# Patient Record
Sex: MALE | Race: WHITE
[De-identification: names, ages, dates, MRNs, and addresses within clinical notes are randomized per-mention and may not be internally consistent; named-entity substitution may affect disease eponyms.]

---

## 2021-03-02 ENCOUNTER — HOSPITAL ENCOUNTER (EMERGENCY)
Dept: HOSPITAL 8 - ED | Age: 66
LOS: 1 days | Discharge: HOME | End: 2021-03-03
Payer: SELF-PAY

## 2021-03-02 VITALS — BODY MASS INDEX: 23.56 KG/M2 | HEIGHT: 72 IN | WEIGHT: 173.94 LBS

## 2021-03-02 DIAGNOSIS — I10: ICD-10-CM

## 2021-03-02 DIAGNOSIS — R91.1: Primary | ICD-10-CM

## 2021-03-02 DIAGNOSIS — R60.0: ICD-10-CM

## 2021-03-02 DIAGNOSIS — R06.00: ICD-10-CM

## 2021-03-02 DIAGNOSIS — I25.2: ICD-10-CM

## 2021-03-02 LAB
ALBUMIN SERPL-MCNC: 3.5 G/DL (ref 3.4–5)
ALP SERPL-CCNC: 92 U/L (ref 45–117)
ALT SERPL-CCNC: 50 U/L (ref 12–78)
ANION GAP SERPL CALC-SCNC: 7 MMOL/L (ref 5–15)
BASOPHILS # BLD AUTO: 0 X10^3/UL (ref 0–0.1)
BASOPHILS NFR BLD AUTO: 1 % (ref 0–1)
BILIRUB SERPL-MCNC: 2.5 MG/DL (ref 0.2–1)
CALCIUM SERPL-MCNC: 8.4 MG/DL (ref 8.5–10.1)
CHLORIDE SERPL-SCNC: 110 MMOL/L (ref 98–107)
CREAT SERPL-MCNC: 1.43 MG/DL (ref 0.7–1.3)
EOSINOPHIL # BLD AUTO: 0 X10^3/UL (ref 0–0.4)
EOSINOPHIL NFR BLD AUTO: 1 % (ref 1–7)
ERYTHROCYTE [DISTWIDTH] IN BLOOD BY AUTOMATED COUNT: 14.3 % (ref 9.4–14.8)
LYMPHOCYTES # BLD AUTO: 1.4 X10^3/UL (ref 1–3.4)
LYMPHOCYTES NFR BLD AUTO: 20 % (ref 22–44)
MCH RBC QN AUTO: 31.7 PG (ref 27.5–34.5)
MCHC RBC AUTO-ENTMCNC: 33.5 G/DL (ref 33.2–36.2)
MD: NO
MONOCYTES # BLD AUTO: 0.5 X10^3/UL (ref 0.2–0.8)
MONOCYTES NFR BLD AUTO: 7 % (ref 2–9)
NEUTROPHILS # BLD AUTO: 4.9 X10^3/UL (ref 1.8–6.8)
NEUTROPHILS NFR BLD AUTO: 71 % (ref 42–75)
PLATELET # BLD AUTO: 202 X10^3/UL (ref 130–400)
PMV BLD AUTO: 9.4 FL (ref 7.4–10.4)
PROT SERPL-MCNC: 6.6 G/DL (ref 6.4–8.2)
RBC # BLD AUTO: 4.58 X10^6/UL (ref 4.38–5.82)
TROPONIN I SERPL-MCNC: 0.01 NG/ML (ref 0–0.04)

## 2021-03-02 PROCEDURE — 71045 X-RAY EXAM CHEST 1 VIEW: CPT

## 2021-03-02 PROCEDURE — 71260 CT THORAX DX C+: CPT

## 2021-03-02 PROCEDURE — 93005 ELECTROCARDIOGRAM TRACING: CPT

## 2021-03-02 PROCEDURE — 85025 COMPLETE CBC W/AUTO DIFF WBC: CPT

## 2021-03-02 PROCEDURE — 84484 ASSAY OF TROPONIN QUANT: CPT

## 2021-03-02 PROCEDURE — 96361 HYDRATE IV INFUSION ADD-ON: CPT

## 2021-03-02 PROCEDURE — 36415 COLL VENOUS BLD VENIPUNCTURE: CPT

## 2021-03-02 PROCEDURE — 80053 COMPREHEN METABOLIC PANEL: CPT

## 2021-03-02 PROCEDURE — 96360 HYDRATION IV INFUSION INIT: CPT

## 2021-03-02 PROCEDURE — 83880 ASSAY OF NATRIURETIC PEPTIDE: CPT

## 2021-03-02 PROCEDURE — 99285 EMERGENCY DEPT VISIT HI MDM: CPT

## 2021-03-02 NOTE — NUR
PT SITTING UPRIGHT ON HSONDA METCALF, VSS. PT DENIES ANY NEEDS AT THIS TIME. CALL 
LIGHT AND PERSONAL BELONGINGS WITHIN REACH.

## 2021-03-02 NOTE — NUR
PT C/O "FEELING REALLY ANXIOUS AND I CAN'T SIT STILL, IM CRAWLING OUT OF MY 
SKIN". ERP AWARE. WILL MEDICATE PER EMAR

## 2021-03-02 NOTE — NUR
PT SITTING UPRIGHT ON GURKRZYSZTOF, NADN, VSS. PIV INSERTION PER ERP ORDER, PT 
TOLERATED WELL. PT DENIES ANY NEEDS AT THIS TIME. CALL LIGHT AND PERSONAL 
BELONGINGS WITHIN REACH.

## 2021-03-02 NOTE — NUR
PT SITTING UPRIGHT ON GURNEY. ROOM AIR O2 SAT NOTECD TO BE 86% ON ROOM AIR. PT 
PLACED ON 2L O2 VIA NASAL CANNULA. PT TOLERATING WELL. NO ADDITIONAL NEEDS AT 
THIS TIME. CALL LIGHT AND PERSONAL BELONGINGS WITHIN REACH

## 2021-03-02 NOTE — NUR
PT SITTING UPRIGHT ON EDGE OF GURNEY, ANXIOUS. "I DONT FEEL ANY LESS ANXIOUS 
AFTER THAT MEDICATION" ERP AWARE. PT DENIES ANY NEEDS AT THIS TIME. CALL LIGHT 
AND BELONGINGS WITHIN REACH

## 2021-03-03 VITALS — SYSTOLIC BLOOD PRESSURE: 156 MMHG | DIASTOLIC BLOOD PRESSURE: 72 MMHG

## 2024-04-02 ENCOUNTER — APPOINTMENT (OUTPATIENT)
Dept: RADIOLOGY | Facility: MEDICAL CENTER | Age: 69
DRG: 193 | End: 2024-04-02
Attending: EMERGENCY MEDICINE
Payer: MEDICARE

## 2024-04-02 ENCOUNTER — HOSPITAL ENCOUNTER (INPATIENT)
Facility: MEDICAL CENTER | Age: 69
LOS: 7 days | DRG: 193 | End: 2024-04-09
Attending: EMERGENCY MEDICINE | Admitting: STUDENT IN AN ORGANIZED HEALTH CARE EDUCATION/TRAINING PROGRAM
Payer: MEDICARE

## 2024-04-02 ENCOUNTER — APPOINTMENT (OUTPATIENT)
Dept: CARDIOLOGY | Facility: MEDICAL CENTER | Age: 69
DRG: 193 | End: 2024-04-02
Attending: STUDENT IN AN ORGANIZED HEALTH CARE EDUCATION/TRAINING PROGRAM
Payer: MEDICARE

## 2024-04-02 DIAGNOSIS — I51.3 LV (LEFT VENTRICULAR) MURAL THROMBUS: ICD-10-CM

## 2024-04-02 DIAGNOSIS — I50.9 ACUTE ON CHRONIC CONGESTIVE HEART FAILURE, UNSPECIFIED HEART FAILURE TYPE (HCC): ICD-10-CM

## 2024-04-02 DIAGNOSIS — I63.449 CEREBROVASCULAR ACCIDENT (CVA) DUE TO EMBOLISM OF CEREBELLAR ARTERY, UNSPECIFIED BLOOD VESSEL LATERALITY (HCC): ICD-10-CM

## 2024-04-02 DIAGNOSIS — I50.23 ACUTE ON CHRONIC SYSTOLIC HEART FAILURE (HCC): ICD-10-CM

## 2024-04-02 DIAGNOSIS — J18.9 COMMUNITY ACQUIRED PNEUMONIA OF RIGHT UPPER LOBE OF LUNG: ICD-10-CM

## 2024-04-02 PROBLEM — I25.10 CORONARY ARTERY DISEASE INVOLVING NATIVE CORONARY ARTERY OF NATIVE HEART WITHOUT ANGINA PECTORIS: Status: ACTIVE | Noted: 2024-04-02

## 2024-04-02 PROBLEM — Z86.711 HISTORY OF PULMONARY EMBOLUS (PE): Status: ACTIVE | Noted: 2024-04-02

## 2024-04-02 PROBLEM — J96.01 ACUTE RESPIRATORY FAILURE WITH HYPOXIA (HCC): Status: ACTIVE | Noted: 2024-04-02

## 2024-04-02 PROBLEM — I10 ESSENTIAL HYPERTENSION: Status: ACTIVE | Noted: 2024-04-02

## 2024-04-02 PROBLEM — E78.5 DYSLIPIDEMIA: Status: ACTIVE | Noted: 2024-04-02

## 2024-04-02 LAB
ANION GAP SERPL CALC-SCNC: 15 MMOL/L (ref 7–16)
APTT PPP: 30 SEC (ref 24.7–36)
BASOPHILS # BLD AUTO: 0.2 % (ref 0–1.8)
BASOPHILS # BLD: 0.02 K/UL (ref 0–0.12)
BUN SERPL-MCNC: 34 MG/DL (ref 8–22)
CALCIUM SERPL-MCNC: 8.7 MG/DL (ref 8.5–10.5)
CHLORIDE SERPL-SCNC: 105 MMOL/L (ref 96–112)
CHOLEST SERPL-MCNC: 138 MG/DL (ref 100–199)
CO2 SERPL-SCNC: 22 MMOL/L (ref 20–33)
CREAT SERPL-MCNC: 1.24 MG/DL (ref 0.5–1.4)
EKG IMPRESSION: NORMAL
EOSINOPHIL # BLD AUTO: 0.01 K/UL (ref 0–0.51)
EOSINOPHIL NFR BLD: 0.1 % (ref 0–6.9)
ERYTHROCYTE [DISTWIDTH] IN BLOOD BY AUTOMATED COUNT: 42.8 FL (ref 35.9–50)
FLUAV RNA SPEC QL NAA+PROBE: NEGATIVE
FLUBV RNA SPEC QL NAA+PROBE: NEGATIVE
GFR SERPLBLD CREATININE-BSD FMLA CKD-EPI: 63 ML/MIN/1.73 M 2
GLUCOSE SERPL-MCNC: 89 MG/DL (ref 65–99)
HCT VFR BLD AUTO: 44.4 % (ref 42–52)
HDLC SERPL-MCNC: 32 MG/DL
HGB BLD-MCNC: 15.5 G/DL (ref 14–18)
IMM GRANULOCYTES # BLD AUTO: 0.02 K/UL (ref 0–0.11)
IMM GRANULOCYTES NFR BLD AUTO: 0.2 % (ref 0–0.9)
INR PPP: 1.51 (ref 0.87–1.13)
LDLC SERPL CALC-MCNC: 88 MG/DL
LV EJECT FRACT  99904: 15
LYMPHOCYTES # BLD AUTO: 1.75 K/UL (ref 1–4.8)
LYMPHOCYTES NFR BLD: 18.3 % (ref 22–41)
MCH RBC QN AUTO: 31.6 PG (ref 27–33)
MCHC RBC AUTO-ENTMCNC: 34.9 G/DL (ref 32.3–36.5)
MCV RBC AUTO: 90.4 FL (ref 81.4–97.8)
MONOCYTES # BLD AUTO: 0.69 K/UL (ref 0–0.85)
MONOCYTES NFR BLD AUTO: 7.2 % (ref 0–13.4)
NEUTROPHILS # BLD AUTO: 7.05 K/UL (ref 1.82–7.42)
NEUTROPHILS NFR BLD: 74 % (ref 44–72)
NRBC # BLD AUTO: 0 K/UL
NRBC BLD-RTO: 0 /100 WBC (ref 0–0.2)
NT-PROBNP SERPL IA-MCNC: ABNORMAL PG/ML (ref 0–125)
PLATELET # BLD AUTO: 246 K/UL (ref 164–446)
PMV BLD AUTO: 10.9 FL (ref 9–12.9)
POTASSIUM SERPL-SCNC: 3.6 MMOL/L (ref 3.6–5.5)
PROCALCITONIN SERPL-MCNC: 0.15 NG/ML
PROTHROMBIN TIME: 18.4 SEC (ref 12–14.6)
RBC # BLD AUTO: 4.91 M/UL (ref 4.7–6.1)
RSV RNA SPEC QL NAA+PROBE: NEGATIVE
SARS-COV-2 RNA RESP QL NAA+PROBE: NOTDETECTED
SODIUM SERPL-SCNC: 142 MMOL/L (ref 135–145)
TRIGL SERPL-MCNC: 90 MG/DL (ref 0–149)
TROPONIN T SERPL-MCNC: 28 NG/L (ref 6–19)
TROPONIN T SERPL-MCNC: 29 NG/L (ref 6–19)
UFH PPP CHRO-ACNC: 0.77 IU/ML
UFH PPP CHRO-ACNC: <0.1 IU/ML
WBC # BLD AUTO: 9.5 K/UL (ref 4.8–10.8)

## 2024-04-02 PROCEDURE — 85730 THROMBOPLASTIN TIME PARTIAL: CPT

## 2024-04-02 PROCEDURE — 700102 HCHG RX REV CODE 250 W/ 637 OVERRIDE(OP): Performed by: INTERNAL MEDICINE

## 2024-04-02 PROCEDURE — 85520 HEPARIN ASSAY: CPT | Mod: 91

## 2024-04-02 PROCEDURE — 99497 ADVNCD CARE PLAN 30 MIN: CPT | Performed by: INTERNAL MEDICINE

## 2024-04-02 PROCEDURE — 84145 PROCALCITONIN (PCT): CPT

## 2024-04-02 PROCEDURE — 71045 X-RAY EXAM CHEST 1 VIEW: CPT

## 2024-04-02 PROCEDURE — 700102 HCHG RX REV CODE 250 W/ 637 OVERRIDE(OP)

## 2024-04-02 PROCEDURE — 85025 COMPLETE CBC W/AUTO DIFF WBC: CPT

## 2024-04-02 PROCEDURE — 0241U HCHG SARS-COV-2 COVID-19 NFCT DS RESP RNA 4 TRGT ED POC: CPT

## 2024-04-02 PROCEDURE — 80061 LIPID PANEL: CPT

## 2024-04-02 PROCEDURE — 700111 HCHG RX REV CODE 636 W/ 250 OVERRIDE (IP)

## 2024-04-02 PROCEDURE — 700111 HCHG RX REV CODE 636 W/ 250 OVERRIDE (IP): Mod: JZ | Performed by: STUDENT IN AN ORGANIZED HEALTH CARE EDUCATION/TRAINING PROGRAM

## 2024-04-02 PROCEDURE — 36415 COLL VENOUS BLD VENIPUNCTURE: CPT

## 2024-04-02 PROCEDURE — 99223 1ST HOSP IP/OBS HIGH 75: CPT | Mod: AI,25 | Performed by: STUDENT IN AN ORGANIZED HEALTH CARE EDUCATION/TRAINING PROGRAM

## 2024-04-02 PROCEDURE — 93306 TTE W/DOPPLER COMPLETE: CPT | Mod: 26 | Performed by: INTERNAL MEDICINE

## 2024-04-02 PROCEDURE — 93005 ELECTROCARDIOGRAM TRACING: CPT | Performed by: EMERGENCY MEDICINE

## 2024-04-02 PROCEDURE — 700102 HCHG RX REV CODE 250 W/ 637 OVERRIDE(OP): Performed by: STUDENT IN AN ORGANIZED HEALTH CARE EDUCATION/TRAINING PROGRAM

## 2024-04-02 PROCEDURE — 83880 ASSAY OF NATRIURETIC PEPTIDE: CPT

## 2024-04-02 PROCEDURE — A9270 NON-COVERED ITEM OR SERVICE: HCPCS

## 2024-04-02 PROCEDURE — 700105 HCHG RX REV CODE 258: Performed by: STUDENT IN AN ORGANIZED HEALTH CARE EDUCATION/TRAINING PROGRAM

## 2024-04-02 PROCEDURE — 85610 PROTHROMBIN TIME: CPT

## 2024-04-02 PROCEDURE — 80048 BASIC METABOLIC PNL TOTAL CA: CPT

## 2024-04-02 PROCEDURE — 99222 1ST HOSP IP/OBS MODERATE 55: CPT | Performed by: INTERNAL MEDICINE

## 2024-04-02 PROCEDURE — 96374 THER/PROPH/DIAG INJ IV PUSH: CPT

## 2024-04-02 PROCEDURE — A9270 NON-COVERED ITEM OR SERVICE: HCPCS | Performed by: INTERNAL MEDICINE

## 2024-04-02 PROCEDURE — 99285 EMERGENCY DEPT VISIT HI MDM: CPT

## 2024-04-02 PROCEDURE — 770020 HCHG ROOM/CARE - TELE (206)

## 2024-04-02 PROCEDURE — 700111 HCHG RX REV CODE 636 W/ 250 OVERRIDE (IP): Performed by: INTERNAL MEDICINE

## 2024-04-02 PROCEDURE — 84484 ASSAY OF TROPONIN QUANT: CPT | Mod: 91

## 2024-04-02 PROCEDURE — 93306 TTE W/DOPPLER COMPLETE: CPT

## 2024-04-02 PROCEDURE — A9270 NON-COVERED ITEM OR SERVICE: HCPCS | Performed by: STUDENT IN AN ORGANIZED HEALTH CARE EDUCATION/TRAINING PROGRAM

## 2024-04-02 RX ORDER — HEPARIN SODIUM 5000 [USP'U]/100ML
0-30 INJECTION, SOLUTION INTRAVENOUS CONTINUOUS
Status: DISCONTINUED | OUTPATIENT
Start: 2024-04-02 | End: 2024-04-03

## 2024-04-02 RX ORDER — HEPARIN SODIUM 1000 [USP'U]/ML
40 INJECTION, SOLUTION INTRAVENOUS; SUBCUTANEOUS PRN
Status: DISCONTINUED | OUTPATIENT
Start: 2024-04-02 | End: 2024-04-03

## 2024-04-02 RX ORDER — LORAZEPAM 2 MG/ML
0.5 INJECTION INTRAMUSCULAR EVERY 4 HOURS PRN
Status: DISCONTINUED | OUTPATIENT
Start: 2024-04-02 | End: 2024-04-09 | Stop reason: HOSPADM

## 2024-04-02 RX ORDER — ENOXAPARIN SODIUM 100 MG/ML
40 INJECTION SUBCUTANEOUS DAILY
Status: DISCONTINUED | OUTPATIENT
Start: 2024-04-02 | End: 2024-04-02

## 2024-04-02 RX ORDER — LOSARTAN POTASSIUM 25 MG/1
25 TABLET ORAL
Status: DISCONTINUED | OUTPATIENT
Start: 2024-04-02 | End: 2024-04-02

## 2024-04-02 RX ORDER — FUROSEMIDE 10 MG/ML
20 INJECTION INTRAMUSCULAR; INTRAVENOUS
Status: DISCONTINUED | OUTPATIENT
Start: 2024-04-02 | End: 2024-04-03

## 2024-04-02 RX ORDER — METOPROLOL SUCCINATE 25 MG/1
25 TABLET, EXTENDED RELEASE ORAL
Status: DISCONTINUED | OUTPATIENT
Start: 2024-04-02 | End: 2024-04-02

## 2024-04-02 RX ORDER — AZITHROMYCIN 250 MG/1
500 TABLET, FILM COATED ORAL DAILY
Qty: 4 TABLET | Refills: 0 | Status: COMPLETED | OUTPATIENT
Start: 2024-04-03 | End: 2024-04-04

## 2024-04-02 RX ORDER — SCOLOPAMINE TRANSDERMAL SYSTEM 1 MG/1
1 PATCH, EXTENDED RELEASE TRANSDERMAL
Status: DISCONTINUED | OUTPATIENT
Start: 2024-04-02 | End: 2024-04-09 | Stop reason: HOSPADM

## 2024-04-02 RX ORDER — ASPIRIN 81 MG/1
81 TABLET ORAL DAILY
Status: DISCONTINUED | OUTPATIENT
Start: 2024-04-02 | End: 2024-04-09 | Stop reason: HOSPADM

## 2024-04-02 RX ORDER — LABETALOL HYDROCHLORIDE 5 MG/ML
10 INJECTION, SOLUTION INTRAVENOUS EVERY 4 HOURS PRN
Status: DISCONTINUED | OUTPATIENT
Start: 2024-04-02 | End: 2024-04-09 | Stop reason: HOSPADM

## 2024-04-02 RX ORDER — ACETAMINOPHEN 325 MG/1
650 TABLET ORAL EVERY 6 HOURS PRN
Status: DISCONTINUED | OUTPATIENT
Start: 2024-04-02 | End: 2024-04-09 | Stop reason: HOSPADM

## 2024-04-02 RX ORDER — ATORVASTATIN CALCIUM 40 MG/1
40 TABLET, FILM COATED ORAL EVERY EVENING
Status: DISCONTINUED | OUTPATIENT
Start: 2024-04-02 | End: 2024-04-09 | Stop reason: HOSPADM

## 2024-04-02 RX ORDER — LOSARTAN POTASSIUM 25 MG/1
25 TABLET ORAL DAILY
Status: DISCONTINUED | OUTPATIENT
Start: 2024-04-02 | End: 2024-04-09 | Stop reason: HOSPADM

## 2024-04-02 RX ORDER — SPIRONOLACTONE 25 MG/1
25 TABLET ORAL
Status: DISCONTINUED | OUTPATIENT
Start: 2024-04-02 | End: 2024-04-09 | Stop reason: HOSPADM

## 2024-04-02 RX ORDER — DAPAGLIFLOZIN 10 MG/1
10 TABLET, FILM COATED ORAL DAILY
Status: DISCONTINUED | OUTPATIENT
Start: 2024-04-02 | End: 2024-04-09 | Stop reason: HOSPADM

## 2024-04-02 RX ORDER — CARVEDILOL 6.25 MG/1
6.25 TABLET ORAL 2 TIMES DAILY WITH MEALS
Status: DISCONTINUED | OUTPATIENT
Start: 2024-04-02 | End: 2024-04-03

## 2024-04-02 RX ORDER — HEPARIN SODIUM 1000 [USP'U]/ML
80 INJECTION, SOLUTION INTRAVENOUS; SUBCUTANEOUS ONCE
Status: COMPLETED | OUTPATIENT
Start: 2024-04-02 | End: 2024-04-02

## 2024-04-02 RX ADMIN — SCOPOLAMINE 1 PATCH: 1.5 PATCH, EXTENDED RELEASE TRANSDERMAL at 20:18

## 2024-04-02 RX ADMIN — ACETAMINOPHEN 650 MG: 325 TABLET, FILM COATED ORAL at 20:17

## 2024-04-02 RX ADMIN — ASPIRIN 81 MG: 81 TABLET, COATED ORAL at 05:47

## 2024-04-02 RX ADMIN — AMPICILLIN AND SULBACTAM 3 G: 1; 2 INJECTION, POWDER, FOR SOLUTION INTRAMUSCULAR; INTRAVENOUS at 17:31

## 2024-04-02 RX ADMIN — METOPROLOL SUCCINATE 25 MG: 25 TABLET, EXTENDED RELEASE ORAL at 05:47

## 2024-04-02 RX ADMIN — SPIRONOLACTONE 25 MG: 25 TABLET ORAL at 15:36

## 2024-04-02 RX ADMIN — CARVEDILOL 6.25 MG: 6.25 TABLET, FILM COATED ORAL at 17:32

## 2024-04-02 RX ADMIN — AMPICILLIN AND SULBACTAM 3 G: 1; 2 INJECTION, POWDER, FOR SOLUTION INTRAMUSCULAR; INTRAVENOUS at 05:49

## 2024-04-02 RX ADMIN — HEPARIN SODIUM 18 UNITS/KG/HR: 5000 INJECTION, SOLUTION INTRAVENOUS at 16:36

## 2024-04-02 RX ADMIN — DAPAGLIFLOZIN 10 MG: 10 TABLET, FILM COATED ORAL at 16:39

## 2024-04-02 RX ADMIN — LORAZEPAM 0.5 MG: 2 INJECTION, SOLUTION INTRAMUSCULAR; INTRAVENOUS at 20:18

## 2024-04-02 RX ADMIN — FUROSEMIDE 20 MG: 10 INJECTION INTRAMUSCULAR; INTRAVENOUS at 15:36

## 2024-04-02 RX ADMIN — LOSARTAN POTASSIUM 25 MG: 25 TABLET, FILM COATED ORAL at 17:32

## 2024-04-02 RX ADMIN — AMPICILLIN AND SULBACTAM 3 G: 1; 2 INJECTION, POWDER, FOR SOLUTION INTRAMUSCULAR; INTRAVENOUS at 12:12

## 2024-04-02 RX ADMIN — HEPARIN SODIUM 5800 UNITS: 1000 INJECTION, SOLUTION INTRAVENOUS; SUBCUTANEOUS at 16:36

## 2024-04-02 RX ADMIN — ATORVASTATIN CALCIUM 40 MG: 40 TABLET, FILM COATED ORAL at 17:32

## 2024-04-02 ASSESSMENT — COGNITIVE AND FUNCTIONAL STATUS - GENERAL
MOBILITY SCORE: 24
MOBILITY SCORE: 24
DAILY ACTIVITIY SCORE: 24
SUGGESTED CMS G CODE MODIFIER MOBILITY: CI
SUGGESTED CMS G CODE MODIFIER MOBILITY: CH
SUGGESTED CMS G CODE MODIFIER MOBILITY: CH
MOBILITY SCORE: 23
CLIMB 3 TO 5 STEPS WITH RAILING: A LITTLE
DAILY ACTIVITIY SCORE: 24
SUGGESTED CMS G CODE MODIFIER DAILY ACTIVITY: CH
SUGGESTED CMS G CODE MODIFIER DAILY ACTIVITY: CH

## 2024-04-02 ASSESSMENT — LIFESTYLE VARIABLES
DOES PATIENT WANT TO STOP DRINKING: NO
HAVE PEOPLE ANNOYED YOU BY CRITICIZING YOUR DRINKING: NO
EVER HAD A DRINK FIRST THING IN THE MORNING TO STEADY YOUR NERVES TO GET RID OF A HANGOVER: NO
CONSUMPTION TOTAL: NEGATIVE
ALCOHOL_USE: YES
AVERAGE NUMBER OF DAYS PER WEEK YOU HAVE A DRINK CONTAINING ALCOHOL: 0
HAVE YOU EVER FELT YOU SHOULD CUT DOWN ON YOUR DRINKING: NO
TOTAL SCORE: 0
HOW MANY TIMES IN THE PAST YEAR HAVE YOU HAD 5 OR MORE DRINKS IN A DAY: 0
TOTAL SCORE: 0
ON A TYPICAL DAY WHEN YOU DRINK ALCOHOL HOW MANY DRINKS DO YOU HAVE: 0
EVER FELT BAD OR GUILTY ABOUT YOUR DRINKING: NO
TOTAL SCORE: 0

## 2024-04-02 ASSESSMENT — ENCOUNTER SYMPTOMS
HEMOPTYSIS: 0
ABDOMINAL PAIN: 0
VOMITING: 0
SHORTNESS OF BREATH: 1
HEMATOCHEZIA: 0
FEVER: 0
DIAPHORESIS: 0
NAUSEA: 0
COUGH: 1
DIARRHEA: 0
CHILLS: 0
WHEEZING: 0

## 2024-04-02 ASSESSMENT — PATIENT HEALTH QUESTIONNAIRE - PHQ9
1. LITTLE INTEREST OR PLEASURE IN DOING THINGS: NOT AT ALL
SUM OF ALL RESPONSES TO PHQ9 QUESTIONS 1 AND 2: 0
2. FEELING DOWN, DEPRESSED, IRRITABLE, OR HOPELESS: NOT AT ALL

## 2024-04-02 ASSESSMENT — PAIN DESCRIPTION - PAIN TYPE: TYPE: ACUTE PAIN

## 2024-04-02 NOTE — PROGRESS NOTES
4 Eyes Skin Assessment Completed by JULIA Mallory and JULIA Long.    Head WDL  Ears WDL  Nose WDL  Mouth WDL  Neck WDL  Breast/Chest WDL  Shoulder Blades WDL  Spine WDL  (R) Arm/Elbow/Hand Blanching  (L) Arm/Elbow/Hand Blanching  Abdomen WDL  Groin WDL  Scrotum/Coccyx/Buttocks WDL  (R) Leg WDL  (L) Leg WDL  (R) Heel/Foot/Toe WDL  (L) Heel/Foot/Toe WDL          Devices In Places Nasal Cannula      Interventions In Place Gray Ear Foams    Possible Skin Injury No    Pictures Uploaded Into Epic N/A  Wound Consult Placed N/A  RN Wound Prevention Protocol Ordered No

## 2024-04-02 NOTE — ED NOTES
Pharmacy Medication Reconciliation      ~Medication reconciliation updated and complete per patient   ~Allergies have been verified and updated   ~No oral ABX within the last 30 days  ~[facility] home pharmacy :  [facility]      ~Anticoagulants (rivaroxaban, apixaban, edoxaban, dabigatran, warfarin, enoxaparin) taken in the last 14 days? NO

## 2024-04-02 NOTE — CONSULTS
Cardiology Initial Consult Note    Date of note:    4/2/2024      Consulting Physician: Alejandro Patiño M.D.    Name:   Ministerio Tenorio     YOB: 1955  Age:   68 y.o.  male   MRN:   [id number]    Reason for Consultation: Heart failure with reduced ejection fraction    HPI:  Ministerio Tenorio is a 68 y.o. male history of coronary artery disease, status post stents in 2022, heart failure with reduced ejection fraction, status post AICD (Medtronic device it seems), hypertension, hyperlipidemia, history of pulmonary embolism, who was transferred from [facility] for further management of heart failure with reduced ejection fraction.    Patient reports he had stopped all his medications about 7 to 8 months ago.  He just had a hard time getting the medications refilled he states.  He was doing okay he reports until 4 days ago when he started to have a cough productive of greenish sputum.  He then progressively got more weak.    In terms of his cardiac history, he has a history of anterior wall myocardial infarction in 6/2019 with a delayed presentation, decreased LVEF of 30 to 35%, subsequently had a dual-chamber AICD placed 3/22/2022.  The patient reports that he has gotten most of his care in the [place] area, at [facility] where his family is at.  However after 2022, he moved up to [facility].  There are multiple notes that indicates when he presented with a large anterior wall infarction, despite stent placement, there was no reflow.  He did also develop an LV apical thrombus for which she did take Eliquis.    Problem list:  Principal Problem:    Acute respiratory failure with hypoxia (HCC) (POA: Yes)  Active Problems:    Essential hypertension (POA: Yes)    Coronary artery disease involving native coronary artery of native heart without angina pectoris (POA: Yes)    Dyslipidemia (POA: Yes)    Pneumonia of both upper lobes due to infectious organism (POA: Yes)    History of  pulmonary embolus (PE) (POA: Yes)  Resolved Problems:    * No resolved hospital problems. *    Past Medical History:   Diagnosis Date    Congestive heart failure (HCC)     Pneumonia      History reviewed. No pertinent surgical history.    No medications prior to admission.     Current Facility-Administered Medications   Medication Dose Route Frequency Provider Last Rate Last Admin    acetaminophen (Tylenol) tablet 650 mg  650 mg Oral Q6HRS PRN Melita Palomares M.D.        labetalol (Normodyne/Trandate) injection 10 mg  10 mg Intravenous Q4HRS PRN Melita Palomares M.D.        aspirin EC tablet 81 mg  81 mg Oral DAILY Melita Palomares M.D.   81 mg at 04/02/24 0547    atorvastatin (Lipitor) tablet 40 mg  40 mg Oral Q EVENING Melita Palomares M.D.        ampicillin/sulbactam (Unasyn) 3 g in  mL IVPB  3 g Intravenous Q6HRS Melita Palomares M.D.   Stopped at 04/02/24 1242    [START ON 4/3/2024] azithromycin (Zithromax) tablet 500 mg  500 mg Oral DAILY Melita Palomares M.D.        carvedilol (Coreg) tablet 6.25 mg  6.25 mg Oral BID WITH MEALS Alejandro Patiño M.D.        heparin infusion 25,000 units in 500 mL 0.45% NACL  0-30 Units/kg/hr Intravenous Continuous Alejandro Patiño M.D.        heparin injection 5,800 Units  80 Units/kg Intravenous Once Alejandro Patiño M.D.        heparin injection 2,900 Units  40 Units/kg Intravenous PRN Alejandro Patiño M.D.        furosemide (Lasix) injection 20 mg  20 mg Intravenous BID DIURETIC Alejandro Patiño M.D.        spironolactone (Aldactone) tablet 25 mg  25 mg Oral Q DAY Alejandro Patiño M.D.        dapagliflozin propanediol (Farxiga) tablet 10 mg  10 mg Oral DAILY Alejandro Patiño M.D.        losartan (Cozaar) tablet 25 mg  25 mg Oral Q DAY Alejandro Patiño M.D.           No Known Allergies    History reviewed. No pertinent family history.    Social History     Socioeconomic History    Marital status:      Spouse name: Not on file    Number of children: Not on file    Years of education: Not on file     Highest education level: Not on file   Occupational History    Not on file   Tobacco Use    Smoking status: Never    Smokeless tobacco: Never   Vaping Use    Vaping Use: Never used   Substance and Sexual Activity    Alcohol use: Not Currently     Alcohol/week: 0.6 oz     Types: 1 Standard drinks or equivalent per week     Comment: occ    Drug use: Yes     Comment: MJ    Sexual activity: Not on file   Other Topics Concern    Not on file   Social History Narrative    Not on file     Social Determinants of Health     Financial Resource Strain: Not on file   Food Insecurity: Not on file   Transportation Needs: Not on file   Physical Activity: Not on file   Stress: Not on file   Social Connections: Not on file   Intimate Partner Violence: Not on file   Housing Stability: Not on file         Intake/Output Summary (Last 24 hours) at 4/2/2024 1453  Last data filed at 4/2/2024 0903  Gross per 24 hour   Intake 220 ml   Output 250 ml   Net -30 ml         Review of Systems   Constitutional: Negative for diaphoresis and fever.   Respiratory:  Positive for cough. Negative for hemoptysis and wheezing.    Gastrointestinal:  Negative for hematochezia and melena.   Genitourinary:  Negative for hematuria.        Physical Exam  Body mass index is 21.7 kg/m².  BP (!) 138/95   Pulse 94   Temp 36.7 °C (98.1 °F) (Temporal)   Resp 18   Ht 1.829 m (6')   Wt 72.6 kg (160 lb)   SpO2 98%   Vitals:    04/02/24 0532 04/02/24 0636 04/02/24 0739 04/02/24 0802   BP: (!) 162/90 (!) 141/79 (!) 138/95    Pulse: 99 92 88 94   Resp:  (!) 21 18    Temp:  36.3 °C (97.4 °F) 36.7 °C (98.1 °F)    TempSrc:  Temporal Temporal    SpO2: 93% 95% 98% 98%   Weight:       Height:         Oxygen Therapy:  Pulse Oximetry: 98 %, O2 (LPM): 2, O2 Delivery Device: Nasal Cannula    Physical Exam  Constitutional:       Appearance: Normal appearance.      Comments: Poor dentition   Eyes:      Extraocular Movements: Extraocular movements intact.      Conjunctiva/sclera:  Conjunctivae normal.   Neck:      Vascular: No carotid bruit or JVD.   Cardiovascular:      Rate and Rhythm: Normal rate and regular rhythm.      Pulses:           Radial pulses are 1+ on the right side and 1+ on the left side.        Posterior tibial pulses are 1+ on the right side and 1+ on the left side.      Heart sounds: Murmur heard.      Early diastolic murmur is present with a grade of 1/4 at the lower left sternal border.      No friction rub. No gallop.   Pulmonary:      Effort: Pulmonary effort is normal. No respiratory distress.      Breath sounds: Normal breath sounds. No wheezing or rales.   Abdominal:      General: Bowel sounds are normal.      Palpations: Abdomen is soft.   Musculoskeletal:      Cervical back: Neck supple.      Right lower leg: No edema.      Left lower leg: No edema.   Skin:     General: Skin is warm and dry.   Neurological:      Mental Status: He is alert and oriented to person, place, and time. Mental status is at baseline.   Psychiatric:         Mood and Affect: Mood normal.          Labs (personally reviewed and notable for):   Lab Results   Component Value Date/Time    SODIUM 142 04/02/2024 02:55 AM    POTASSIUM 3.6 04/02/2024 02:55 AM    CHLORIDE 105 04/02/2024 02:55 AM    CO2 22 04/02/2024 02:55 AM    GLUCOSE 89 04/02/2024 02:55 AM    BUN 34 (H) 04/02/2024 02:55 AM    CREATININE 1.24 04/02/2024 02:55 AM      Lab Results   Component Value Date/Time    WBC 9.5 04/02/2024 02:55 AM    RBC 4.91 04/02/2024 02:55 AM    HEMOGLOBIN 15.5 04/02/2024 02:55 AM    HEMATOCRIT 44.4 04/02/2024 02:55 AM    MCV 90.4 04/02/2024 02:55 AM    MCH 31.6 04/02/2024 02:55 AM    MCHC 34.9 04/02/2024 02:55 AM    MPV 10.9 04/02/2024 02:55 AM    NEUTSPOLYS 74.00 (H) 04/02/2024 02:55 AM    LYMPHOCYTES 18.30 (L) 04/02/2024 02:55 AM    MONOCYTES 7.20 04/02/2024 02:55 AM    EOSINOPHILS 0.10 04/02/2024 02:55 AM    BASOPHILS 0.20 04/02/2024 02:55 AM      Lab Results   Component Value Date/Time    CHOLSTRLTOT 138  2024 02:51 AM    LDL 88 2024 02:51 AM    HDL 32 (A) 2024 02:51 AM    TRIGLYCERIDE 90 2024 02:51 AM       Lab Results   Component Value Date/Time    TROPONINT 28 (H) 2024 025     Lab Results   Component Value Date/Time    NTPROBNP 47009 (H) 2024 0255     Lab Results   Component Value Date/Time    HBA1C 5.5 2022 1212     Cardiac Imaging and Procedures Review:    EKG dated 2024: My personal interpretation is sinus rhythm, 95 bpm, left anterior fascicular block, poor R wave progression, consider anterior infarct age undetermined,, LVH, no prior ECG for comparison    Echo dated 2024: Dilated left ventricular cavity size with mild concentric hypertrophy, and severely decreased systolic function, visually estimated LVEF is less than 20%.  Left ventricular apical thrombus is seen.  Borderline right ventricular cavity size with low-normal systolic function.  Device wire seen.  Normal aortic root size.  Mild mitral and aortic regurgitation.      Radiology test Review:  EC-ECHOCARDIOGRAM COMPLETE W/O CONT   Final Result      DX-CHEST-PORTABLE (1 VIEW)   Final Result         1.  Scattered hazy bilateral pulmonary infiltrates, greatest in the right upper lobe.   2.  Trace bilateral pleural effusions   3.  Cardiomegaly   4.  Atherosclerosis        Impression and Medical Decision Makin.  Heart failure with severely reduced ejection fraction.  Patient does not appear significantly fluid overloaded, also does not appear to be hypoperfusing at this time.  There may be a component of upper respiratory infection causing some decompensation.  Patient is willing to try to be more compliant at this time.  He states he [place] will [place] back [place] or stay in the [facility] as there is no cardiology care up where he is currently living.  Although he has a history of methamphetamine use, he reports he has not done any methamphetamine for 40 years.  - Continue with carvedilol  6.25 mg p.o. twice daily  - Continue with losartan 25 mg p.o. daily, titrate up as blood pressure will tolerate it  - Continue with spironolactone 25 mg p.o. daily and Farxiga also has been started.  Agree with IV Lasix 20 twice daily, can change over to p.o. 40 p.o. daily fairly soon.  - Will schedule him for follow-up at heart failure clinic if he decides to remain in [place]    2.  He has a Medtronic dual-chamber AICD.  Unlikely has been interrogated recently.  Will have it interrogated since he is currently in the hospital.  Do not suspect any malfunction.  Patient does not report any firings.    3.  LV apical thrombus.  Agree with heparin drip at this time.  Although traditionally warfarin has been used with goal INR of 2-3 for LV apical thrombus, there has been retrospective study and based on  guidelines that direct thrombin inhibitors is noninferior to warfarin and 1 can be considered in the setting.  Dosages is not as clear, however would recommend if you are going to use Eliquis.  - Due to compliance issue, vitamin K antagonist such as warfarin with frequent INR draws seems less feasible for this patient.  - Recommend Eliquis start with 10 mg twice daily for 7 days followed by 5 mg twice daily for total of 3 months treatment.  This usually should not have to be bridged with heparin drip, can try to overlap it a little bit.    4.  Coronary artery disease, troponin here has been unimpressive no acute ECG changes.  Continue with secondary prevention.  For now would hold off enteric-coated aspirin as patient is to be started on Eliquis.  Can consider adding aspirin as an outpatient.    Thank you for allowing me to participate in the care of this patient.  Please contact me with any questions.      Saloni Joe M.D.  Cardiologist, [facility] [place]     This note was generated using voice recognition software which has a small chance of producing errors of grammar and possibly content. I  have made every reasonable attempt to find and correct any obvious errors, but expect that some may not be found prior to finalization of this note.

## 2024-04-02 NOTE — ASSESSMENT & PLAN NOTE
Patient denies any history of any blood clot in his lungs and is on apixaban  He stopped taking all his meds.  CTA at outside facility negative for PE  Outpatient follow-up

## 2024-04-02 NOTE — ASSESSMENT & PLAN NOTE
Requiring fluid on my evaluation, desaturating on oxygen was lowered  Possible related to systolic HF now on lasix also concern for pneumonia cont on abx switching to Augmentin  Wean off O 2 as tolerated

## 2024-04-02 NOTE — ED NOTES
Pt medicated per MAR. Pt provided juice per request. Pt updated on POC and has no additional requests at this time.

## 2024-04-02 NOTE — PROGRESS NOTES
Patient seen and evaluated bedside.  2D echo reveals severe heart failure with reduced ejection fraction and LV thrombus.  I have consulted cardiology.  I have started the patient on IV heparin, monitor Xa.  I have started the patient on Coreg, losartan, Aldactone and Farxiga.  Patient states he last saw his cardiologist 2 years ago and he stopped taking his medications 6 months ago because he felt better and did not think he needed to take them anymore.     I counseled the patient on the importance of continuing his medications as prescribed and to continue close follow-up with cardiology.  Patient agrees and would like to get back on medications to treat his CAD and CHF    I discussed advance care planning for at least 16 minutes with the patient, including diagnosis of CAD and severe CHF with reduced EF with guarded prognosis, plan of care, risks and benefits of any therapies that could be offered, as well as alternatives including palliation.  The patient would like to be a full code, would like to continue full medical treatment and would also be open to undergoing any intervention if required. Time spent is exclusive of evaluation and management or other separately billable procedures.

## 2024-04-02 NOTE — ASSESSMENT & PLAN NOTE
Patient feeling shortness of breath, has had a productive cough for the past 4 days.  Denies fever/chills  Respiratory panel, procalcitonin ordered  He was started on antibiotics at outside facility, will continue for now

## 2024-04-02 NOTE — ED PROVIDER NOTES
ED Provider Note    CHIEF COMPLAINT  Chief Complaint   Patient presents with    Chest Pain     x4days    Shortness of Breath     X4 days, worsens with exertion          EXTERNAL RECORDS REVIEWED  External ED Note patient was seen at [facility] for progressively worsening shortness of breath over the last 4 days found to be hypoxic with fluid overload on arrival lobe pneumonia.  In the setting of a history of CHF.  Patient was treated with 40 of Lasix and 3 g Unasyn prior to arrival.    HPI/ROS  LIMITATION TO HISTORY   Select: : None  OUTSIDE HISTORIAN(S):  Patient provided 2 L in route and was doing well    Ministerio Tenorio is a 68 y.o. male who presents to the emerged permit as a transfer for history of CHF noncompliant with meds over the last year with fluid overload and right upper lobe pneumonia and hypoxia.  Patient states he feels a lot better on the oxygen.  Her last 4 days with progressive worsening shortness of breath and cough.  No nausea no vomiting.  He states that he did not take meds because he moved too far away to have any follow-up and so did not have any medications for his heart failure.  He is not really complaining of any chest pain or significant leg swelling just feels short of breath with any type of exertion.    PAST MEDICAL HISTORY       SURGICAL HISTORY  patient denies any surgical history    FAMILY HISTORY  No family history on file.    SOCIAL HISTORY  Social History     Tobacco Use    Smoking status: Not on file    Smokeless tobacco: Not on file   Substance and Sexual Activity    Alcohol use: Not on file    Drug use: Not on file    Sexual activity: Not on file       CURRENT MEDICATIONS  Home Medications    **Home medications have not yet been reviewed for this encounter**         ALLERGIES  Not on File    PHYSICAL EXAM  VITAL SIGNS: /75   Pulse 91   Temp 36.8 °C (98.3 °F)   Resp 15   Ht 1.829 m (6')   Wt 72.6 kg (160 lb)   SpO2 99%   BMI 21.70 kg/m²    Pulse OX: Pulse  Oxygen level is normal on 2 L nasal cannula  Constitutional: Alert in no apparent distress.  HENT: Normocephalic, Atraumatic, MMM  Eyes: PERound. Conjunctiva normal, non-icteric.   Heart: Regular rate and rhythm, intact distal pulses   Lungs: Symmetrical movement, no resp distress mild tachypnea with rales bilaterally  Abdomen: Non-tender, non-distended, normal bowel sounds  EXT/Back no edema  Skin: Warm, Dry, No erythema, No rash.   Neurologic: Alert and oriented, Grossly non-focal.       EKG/LABS  Labs Reviewed   TROPONIN - Abnormal; Notable for the following components:       Result Value    Troponin T 28 (*)     All other components within normal limits   CBC WITH DIFFERENTIAL - Abnormal; Notable for the following components:    Neutrophils-Polys 74.00 (*)     Lymphocytes 18.30 (*)     All other components within normal limits   BASIC METABOLIC PANEL - Abnormal; Notable for the following components:    Bun 34 (*)     All other components within normal limits   PROBRAIN NATRIURETIC PEPTIDE, NT - Abnormal; Notable for the following components:    NT-proBNP 80034 (*)     All other components within normal limits   LIPID PROFILE - Abnormal; Notable for the following components:    HDL 32 (*)     All other components within normal limits   ESTIMATED GFR   COV-2, FLU A/B, AND RSV BY PCR (CEPHEID)   LACTIC ACID   RESPIRATORY PANEL, PCR (W/SARS COV-2)   CULTURE RESPIRATORY W/ GRM STN   POC COV-2, FLU A/B, RSV BY PCR     Results for orders placed or performed during the hospital encounter of 24   EKG (NOW)   Result Value Ref Range    Report       [facility] Emergency Dept.    Test Date:  2024  Pt Name:    DASIA ORNELAS             Department: ER  MRN:        [id number]                      Room:       RD 11  Gender:     Male                         Technician: [id number]  :        1955                   Requested By:MONSERRAT CAMILO  Order #:    [id number]                    Sabrina MD:  Adenike Danielson MD    Measurements  Intervals                                Axis  Rate:       95                           P:          29  IL:         178                          QRS:        -53  QRSD:       115                          T:          103  QT:         431  QTc:        542    Interpretive Statements  Sinus rhythm rate 95 no ST elevations or an ST depressions Q waves in the  anterior leads consistent with prior MI as well as LVH.  No previous ECG available for comparison  Electronically Signed On 04- 05:25:09 PDT by Adenike Danielson MD         I have independently interpreted this EKG    RADIOLOGY  I have independently interpreted the diagnostic imaging associated with this visit and am waiting the final reading from the radiologist.   My preliminary interpretation is as follows:     Chest x-ray with a right upper lobe pneumonia and interstitial process consistent with edema.    Radiologist interpretation:  DX-CHEST-PORTABLE (1 VIEW)   Final Result         1.  Scattered hazy bilateral pulmonary infiltrates, greatest in the right upper lobe.   2.  Trace bilateral pleural effusions   3.  Cardiomegaly   4.  Atherosclerosis      EC-ECHOCARDIOGRAM COMPLETE W/O CONT    (Results Pending)       COURSE & MEDICAL DECISION MAKING    ASSESSMENT, COURSE AND PLAN/ DISPOSITION AND DISCUSSIONS  Care Narrative:     Patient is a 68-year-old male presents emerged department as a transfer for acute CHF with a right upper lobe pneumonia in setting of hypoxia.  Noncompliant with medications over the last year.  CT at the outside facility showed evidence of pulmonary embolism.  He is doing better on nasal cannula and I spoke with Dr. Palomares with the medicine service and he has accepted the patient for hospitalization.  Heart score is not performed of this patient's not having chest pain        I have discussed management of the patient with the following physicians and ANDRE's:  Dr Palomares    Discussion of management with  other [facility] or appropriate source(s): None       FINAL DIAGNOSIS  1. Acute on chronic congestive heart failure, unspecified heart failure type (HCC)    2. Community acquired pneumonia of right upper lobe of lung           Electronically signed by: Adenike Danielson M.D., 4/2/2024 2:38 AM

## 2024-04-02 NOTE — ED TRIAGE NOTES
Chief Complaint   Patient presents with    Chest Pain     x4days    Shortness of Breath     X4 days, worsens with exertion        BIB EMS from [place] [facility] for above complaint. Pt received 3g Unasyn and 40mg Lasix PTA.     BP (!) 143/91   Pulse 94   Temp 36.8 °C (98.3 °F)   Resp 15   Ht 1.829 m (6')   Wt 72.6 kg (160 lb)   SpO2 95%   BMI 21.70 kg/m²

## 2024-04-02 NOTE — H&P
Hospital Medicine History & Physical Note    Date of Service  4/2/2024    Primary Care Physician  No primary care provider on file.    Code Status  Full Code    Chief Complaint  Chief Complaint   Patient presents with    Chest Pain     x4days    Shortness of Breath     X4 days, worsens with exertion          History of Presenting Illness  Ministerio Tenorio is a 68 y.o. male who was transferred from outside facility 4/2/2024 with shortness of breath and chest pain.  PMH of CAD s/p 4 stents in 2022, s/p AICD, hypertension, dyslipidemia, PE.  Patient said about 7 or 8 months ago he stopped taking all his medications because he could not reach his doctors in [place].  For the past 4 days he has had shortness of breath, right-sided sharp chest pain that came on at rest, productive cough.  Denies fever/chills, no hemoptysis.    At outside facility afebrile, hemodynamically stable.  He was requiring oxygen and still on 2 L on my evaluation here.  Labs are unremarkable creatinine is 1.2, NT proBNP 19,000, troponin slightly elevated 0.05 (upper limit of normal at 0.04), otherwise unremarkable.  CT of the chest did not show any PE.    I discussed the plan of care with patient, bedside RN, and EDP .    Review of Systems  Review of Systems   Constitutional:  Negative for chills and fever.   Respiratory:  Positive for cough and shortness of breath.    Cardiovascular:  Negative for chest pain.   Gastrointestinal:  Negative for abdominal pain, diarrhea, nausea and vomiting.   Genitourinary:  Negative for dysuria and urgency.       Past Medical History      Surgical History   has no past surgical history on file.     Family History  Family history reviewed with patient. There is no family history that is pertinent to the chief complaint.     Social History   reports that he has never smoked. He has never used smokeless tobacco. He reports current alcohol use.    Allergies  No Known Allergies    Medications  None       Physical  Exam  Temp:  [36.8 °C (98.3 °F)] 36.8 °C (98.3 °F)  Pulse:  [94] 94  Resp:  [15] 15  BP: (143)/(91) 143/91  SpO2:  [95 %] 95 %  Blood Pressure : (!) 143/91   Temperature: 36.8 °C (98.3 °F)   Pulse: 94   Respiration: 15   Pulse Oximetry: 95 %       Physical Exam  Constitutional:       Appearance: Normal appearance.   HENT:      Head: Normocephalic and atraumatic.      Mouth/Throat:      Mouth: Mucous membranes are moist.      Pharynx: Oropharynx is clear. No oropharyngeal exudate or posterior oropharyngeal erythema.   Eyes:      General: No scleral icterus.  Cardiovascular:      Rate and Rhythm: Normal rate and regular rhythm.      Pulses: Normal pulses.      Heart sounds: Normal heart sounds. No murmur heard.  Pulmonary:      Effort: Pulmonary effort is normal. No respiratory distress.      Breath sounds: Normal breath sounds. No wheezing.   Abdominal:      Palpations: Abdomen is soft.      Tenderness: There is no abdominal tenderness.   Musculoskeletal:         General: No swelling or tenderness. Normal range of motion.      Cervical back: Normal range of motion.   Skin:     General: Skin is warm and dry.   Neurological:      General: No focal deficit present.      Mental Status: He is alert and oriented to person, place, and time. Mental status is at baseline.   Psychiatric:         Mood and Affect: Mood normal.         Laboratory:  Recent Labs     04/02/24  0255   WBC 9.5   RBC 4.91   HEMOGLOBIN 15.5   HEMATOCRIT 44.4   MCV 90.4   MCH 31.6   MCHC 34.9   RDW 42.8   PLATELETCT 246   MPV 10.9     Recent Labs     04/02/24  0255   SODIUM 142   POTASSIUM 3.6   CHLORIDE 105   CO2 22   GLUCOSE 89   BUN 34*   CREATININE 1.24   CALCIUM 8.7     Recent Labs     04/02/24  0255   GLUCOSE 89         Recent Labs     04/02/24  0255   NTPROBNP 96067*         Recent Labs     04/02/24  0255   TROPONINT 28*       Imaging:  DX-CHEST-PORTABLE (1 VIEW)   Final Result         1.  Scattered hazy bilateral pulmonary infiltrates, greatest  in the right upper lobe.   2.  Trace bilateral pleural effusions   3.  Cardiomegaly   4.  Atherosclerosis      EC-ECHOCARDIOGRAM COMPLETE W/O CONT    (Results Pending)       X-Ray:  I have personally reviewed the images and compared with prior images. and My impression is: Bilateral infiltrates worse on the right  EKG:  I have personally reviewed the images and compared with prior images. and My impression is: NSR, LVH, IVCD    Assessment/Plan:  Justification for Admission Status  I anticipate this patient will require at least two midnights for appropriate medical management, necessitating inpatient admission because acute hypoxemic respiratory failure      * Acute respiratory failure with hypoxia (HCC)- (present on admission)  Assessment & Plan  Requiring fluid on my evaluation, desaturating on oxygen was lowered  Shortness of breath and cough for the past few days, likely pneumonia  CXR reviewed and shows infiltrates bilaterally worse on the right  CTA chest at outside facility showed no PE.  Attempted to get images pushed over however, that department was closed at [facility].  Day team to discuss with radiology for transfer of images  He has some trace pleural effusions, possible interstitial edema but no lower extremity edema.  Echocardiogram ordered    Pneumonia of both upper lobes due to infectious organism- (present on admission)  Assessment & Plan  Patient feeling shortness of breath, has had a productive cough for the past 4 days.  Denies fever/chills  Respiratory panel, procalcitonin ordered  He was started on antibiotics at outside facility, will continue for now    History of pulmonary embolus (PE)- (present on admission)  Assessment & Plan  Patient denies any history of any blood clot in his lungs and is on apixaban  He stopped taking all his meds.  CTA at outside facility negative for PE  Outpatient follow-up    Coronary artery disease involving native coronary artery of native heart without  angina pectoris- (present on admission)  Assessment & Plan  Said he had 4 stents placed 2022.  Has not been on any of his medications  Restart aspirin    Essential hypertension- (present on admission)  Assessment & Plan  Stop taking all his medications few months ago because he could not follow his physician  Restart metoprolol.  As needed antihypertensives ordered    Dyslipidemia- (present on admission)  Assessment & Plan  He stopped taking his meds.  Restart atorvastatin        VTE prophylaxis: Enoxaparin

## 2024-04-02 NOTE — PROGRESS NOTES
Patient transferred to T7. Heparin drip started. Tele box placed, monitor room notified. A&Ox4. 2.5 L NC. Call light within reach. Urinal at bedside.

## 2024-04-02 NOTE — ASSESSMENT & PLAN NOTE
Stop taking all his medications few months ago because he could not follow his physician  Restart metoprolol.  As needed antihypertensives ordered  Monitor and adjust as needed

## 2024-04-03 PROBLEM — I50.20 SYSTOLIC HF (HEART FAILURE) (HCC): Status: ACTIVE | Noted: 2024-04-03

## 2024-04-03 PROBLEM — I51.3 LV (LEFT VENTRICULAR) MURAL THROMBUS: Status: ACTIVE | Noted: 2024-04-03

## 2024-04-03 LAB
ANION GAP SERPL CALC-SCNC: 12 MMOL/L (ref 7–16)
BUN SERPL-MCNC: 32 MG/DL (ref 8–22)
CALCIUM SERPL-MCNC: 8.1 MG/DL (ref 8.5–10.5)
CHLORIDE SERPL-SCNC: 103 MMOL/L (ref 96–112)
CO2 SERPL-SCNC: 23 MMOL/L (ref 20–33)
CREAT SERPL-MCNC: 1.13 MG/DL (ref 0.5–1.4)
ERYTHROCYTE [DISTWIDTH] IN BLOOD BY AUTOMATED COUNT: 42.8 FL (ref 35.9–50)
GFR SERPLBLD CREATININE-BSD FMLA CKD-EPI: 71 ML/MIN/1.73 M 2
GLUCOSE SERPL-MCNC: 117 MG/DL (ref 65–99)
HCT VFR BLD AUTO: 40.3 % (ref 42–52)
HGB BLD-MCNC: 13.3 G/DL (ref 14–18)
LACTATE SERPL-SCNC: 1.5 MMOL/L (ref 0.5–2)
MCH RBC QN AUTO: 30.4 PG (ref 27–33)
MCHC RBC AUTO-ENTMCNC: 33 G/DL (ref 32.3–36.5)
MCV RBC AUTO: 92 FL (ref 81.4–97.8)
PLATELET # BLD AUTO: 210 K/UL (ref 164–446)
PMV BLD AUTO: 11.2 FL (ref 9–12.9)
POTASSIUM SERPL-SCNC: 3.3 MMOL/L (ref 3.6–5.5)
RBC # BLD AUTO: 4.38 M/UL (ref 4.7–6.1)
SODIUM SERPL-SCNC: 138 MMOL/L (ref 135–145)
UFH PPP CHRO-ACNC: 0.63 IU/ML
WBC # BLD AUTO: 9.2 K/UL (ref 4.8–10.8)

## 2024-04-03 PROCEDURE — 700102 HCHG RX REV CODE 250 W/ 637 OVERRIDE(OP): Performed by: STUDENT IN AN ORGANIZED HEALTH CARE EDUCATION/TRAINING PROGRAM

## 2024-04-03 PROCEDURE — A9270 NON-COVERED ITEM OR SERVICE: HCPCS

## 2024-04-03 PROCEDURE — 90677 PCV20 VACCINE IM: CPT | Performed by: INTERNAL MEDICINE

## 2024-04-03 PROCEDURE — 85027 COMPLETE CBC AUTOMATED: CPT

## 2024-04-03 PROCEDURE — A9270 NON-COVERED ITEM OR SERVICE: HCPCS | Performed by: STUDENT IN AN ORGANIZED HEALTH CARE EDUCATION/TRAINING PROGRAM

## 2024-04-03 PROCEDURE — 700102 HCHG RX REV CODE 250 W/ 637 OVERRIDE(OP): Performed by: INTERNAL MEDICINE

## 2024-04-03 PROCEDURE — 99233 SBSQ HOSP IP/OBS HIGH 50: CPT | Performed by: INTERNAL MEDICINE

## 2024-04-03 PROCEDURE — 85520 HEPARIN ASSAY: CPT

## 2024-04-03 PROCEDURE — 80048 BASIC METABOLIC PNL TOTAL CA: CPT

## 2024-04-03 PROCEDURE — 83605 ASSAY OF LACTIC ACID: CPT

## 2024-04-03 PROCEDURE — 770020 HCHG ROOM/CARE - TELE (206)

## 2024-04-03 PROCEDURE — 700111 HCHG RX REV CODE 636 W/ 250 OVERRIDE (IP): Performed by: INTERNAL MEDICINE

## 2024-04-03 PROCEDURE — 90471 IMMUNIZATION ADMIN: CPT

## 2024-04-03 PROCEDURE — A9270 NON-COVERED ITEM OR SERVICE: HCPCS | Performed by: INTERNAL MEDICINE

## 2024-04-03 PROCEDURE — 700102 HCHG RX REV CODE 250 W/ 637 OVERRIDE(OP)

## 2024-04-03 PROCEDURE — 3E0234Z INTRODUCTION OF SERUM, TOXOID AND VACCINE INTO MUSCLE, PERCUTANEOUS APPROACH: ICD-10-PCS | Performed by: INTERNAL MEDICINE

## 2024-04-03 PROCEDURE — 99232 SBSQ HOSP IP/OBS MODERATE 35: CPT | Performed by: INTERNAL MEDICINE

## 2024-04-03 PROCEDURE — 700111 HCHG RX REV CODE 636 W/ 250 OVERRIDE (IP): Mod: JZ | Performed by: STUDENT IN AN ORGANIZED HEALTH CARE EDUCATION/TRAINING PROGRAM

## 2024-04-03 PROCEDURE — 36415 COLL VENOUS BLD VENIPUNCTURE: CPT

## 2024-04-03 PROCEDURE — 700105 HCHG RX REV CODE 258: Performed by: STUDENT IN AN ORGANIZED HEALTH CARE EDUCATION/TRAINING PROGRAM

## 2024-04-03 RX ORDER — AMOXICILLIN AND CLAVULANATE POTASSIUM 875; 125 MG/1; MG/1
1 TABLET, FILM COATED ORAL EVERY 12 HOURS
Status: COMPLETED | OUTPATIENT
Start: 2024-04-03 | End: 2024-04-06

## 2024-04-03 RX ORDER — FUROSEMIDE 40 MG/1
40 TABLET ORAL
Status: DISCONTINUED | OUTPATIENT
Start: 2024-04-03 | End: 2024-04-06

## 2024-04-03 RX ORDER — CARVEDILOL 12.5 MG/1
12.5 TABLET ORAL 2 TIMES DAILY WITH MEALS
Status: DISCONTINUED | OUTPATIENT
Start: 2024-04-03 | End: 2024-04-09 | Stop reason: HOSPADM

## 2024-04-03 RX ORDER — POTASSIUM CHLORIDE 20 MEQ/1
40 TABLET, EXTENDED RELEASE ORAL ONCE
Status: COMPLETED | OUTPATIENT
Start: 2024-04-03 | End: 2024-04-03

## 2024-04-03 RX ADMIN — CARVEDILOL 6.25 MG: 6.25 TABLET, FILM COATED ORAL at 08:18

## 2024-04-03 RX ADMIN — PNEUMOCOCCAL 20-VALENT CONJUGATE VACCINE 0.5 ML
2.2; 2.2; 2.2; 2.2; 2.2; 2.2; 2.2; 2.2; 2.2; 2.2; 2.2; 2.2; 2.2; 2.2; 2.2; 2.2; 4.4; 2.2; 2.2; 2.2 INJECTION, SUSPENSION INTRAMUSCULAR at 12:54

## 2024-04-03 RX ADMIN — POTASSIUM CHLORIDE 40 MEQ: 1500 TABLET, EXTENDED RELEASE ORAL at 09:09

## 2024-04-03 RX ADMIN — SPIRONOLACTONE 25 MG: 25 TABLET ORAL at 05:38

## 2024-04-03 RX ADMIN — AMPICILLIN AND SULBACTAM 3 G: 1; 2 INJECTION, POWDER, FOR SOLUTION INTRAMUSCULAR; INTRAVENOUS at 06:21

## 2024-04-03 RX ADMIN — AMOXICILLIN AND CLAVULANATE POTASSIUM 1 TABLET: 875; 125 TABLET, FILM COATED ORAL at 17:04

## 2024-04-03 RX ADMIN — AMPICILLIN AND SULBACTAM 3 G: 1; 2 INJECTION, POWDER, FOR SOLUTION INTRAMUSCULAR; INTRAVENOUS at 01:01

## 2024-04-03 RX ADMIN — AZITHROMYCIN DIHYDRATE 500 MG: 250 TABLET, FILM COATED ORAL at 05:38

## 2024-04-03 RX ADMIN — APIXABAN 10 MG: 5 TABLET, FILM COATED ORAL at 12:53

## 2024-04-03 RX ADMIN — LOSARTAN POTASSIUM 25 MG: 25 TABLET, FILM COATED ORAL at 17:04

## 2024-04-03 RX ADMIN — ATORVASTATIN CALCIUM 40 MG: 40 TABLET, FILM COATED ORAL at 17:04

## 2024-04-03 RX ADMIN — DAPAGLIFLOZIN 10 MG: 10 TABLET, FILM COATED ORAL at 05:38

## 2024-04-03 RX ADMIN — ASPIRIN 81 MG: 81 TABLET, COATED ORAL at 05:38

## 2024-04-03 RX ADMIN — CARVEDILOL 12.5 MG: 12.5 TABLET, FILM COATED ORAL at 17:03

## 2024-04-03 RX ADMIN — FUROSEMIDE 20 MG: 10 INJECTION INTRAMUSCULAR; INTRAVENOUS at 05:38

## 2024-04-03 ASSESSMENT — ENCOUNTER SYMPTOMS
ABDOMINAL PAIN: 0
DIARRHEA: 0
SHORTNESS OF BREATH: 1
COUGH: 1
FEVER: 0
VOMITING: 0
NAUSEA: 0
CHILLS: 0

## 2024-04-03 NOTE — PROGRESS NOTES
Hospital Medicine Daily Progress Note    Date of Service  4/3/2024    Chief Complaint  Ministerio Tenorio is a 68 y.o. male admitted 4/2/2024 with chest pain and SOB      Hospital Course  This is a 68 y.o. male who was transferred from outside facility 4/2/2024 with shortness of breath and chest pain.  PMH of CAD s/p 4 stents in 2022, s/p AICD, hypertension, dyslipidemia, PE.  Patient said about 7 or 8 months ago he stopped taking all his medications because he could not reach his doctors in [place].  For the past 4 days he has had shortness of breath, right-sided sharp chest pain that came on at rest, productive cough.  Denies fever/chills, no hemoptysis.   He was found to have an elevated BNP concern for HF, echo showed EF of 15% and cardiology was consulted.  Patient admitted for further treatment     Interval Problem Update  Patient seen and examined, afebrile, resting in bed, denies chest pain, seen by cardiology and case discussed, now GDMT also on lasix,   Regarding his LV thrombus, started on initially on heparin héctor switch to eliquis  Wean off O2 as tolerated     I have discussed this patient's plan of care and discharge plan at IDT rounds today with Case Management, Nursing, Nursing leadership, and other members of the IDT team.    Consultants/Specialty  cardiology    Code Status  Full Code    Disposition  The patient is not medically cleared for discharge to home or a post-acute facility.      I have placed the appropriate orders for post-discharge needs.    Review of Systems  Review of Systems   Constitutional:  Negative for chills and fever.   Respiratory:  Positive for cough and shortness of breath.    Cardiovascular:  Negative for chest pain.   Gastrointestinal:  Negative for abdominal pain, diarrhea, nausea and vomiting.   Genitourinary:  Negative for dysuria and urgency.        Physical Exam  Temp:  [36.1 °C (97 °F)-36.6 °C (97.9 °F)] 36.1 °C (97 °F)  Pulse:  [70-94] 79  Resp:  [16-20] 17  BP:  ()/(48-88) 96/56  SpO2:  [94 %-99 %] 94 %    Physical Exam  Constitutional:       Appearance: Normal appearance.   HENT:      Head: Normocephalic and atraumatic.      Mouth/Throat:      Mouth: Mucous membranes are moist.      Pharynx: Oropharynx is clear. No oropharyngeal exudate or posterior oropharyngeal erythema.   Eyes:      General: No scleral icterus.  Cardiovascular:      Rate and Rhythm: Normal rate and regular rhythm.      Pulses: Normal pulses.      Heart sounds: Normal heart sounds. No murmur heard.  Pulmonary:      Effort: Pulmonary effort is normal. No respiratory distress.      Breath sounds: Normal breath sounds. No wheezing.   Abdominal:      Palpations: Abdomen is soft.      Tenderness: There is no abdominal tenderness.   Musculoskeletal:         General: No swelling or tenderness. Normal range of motion.      Cervical back: Normal range of motion.   Skin:     General: Skin is warm and dry.   Neurological:      General: No focal deficit present.      Mental Status: He is alert and oriented to person, place, and time. Mental status is at baseline.   Psychiatric:         Mood and Affect: Mood normal.         Fluids    Intake/Output Summary (Last 24 hours) at 4/3/2024 1251  Last data filed at 4/3/2024 1000  Gross per 24 hour   Intake 660 ml   Output 2183 ml   Net -1523 ml       Laboratory  Recent Labs     04/02/24  0255 04/03/24  0135   WBC 9.5 9.2   RBC 4.91 4.38*   HEMOGLOBIN 15.5 13.3*   HEMATOCRIT 44.4 40.3*   MCV 90.4 92.0   MCH 31.6 30.4   MCHC 34.9 33.0   RDW 42.8 42.8   PLATELETCT 246 210   MPV 10.9 11.2     Recent Labs     04/02/24  0255 04/03/24  0135   SODIUM 142 138   POTASSIUM 3.6 3.3*   CHLORIDE 105 103   CO2 22 23   GLUCOSE 89 117*   BUN 34* 32*   CREATININE 1.24 1.13   CALCIUM 8.7 8.1*     Recent Labs     04/02/24  1439   APTT 30.0   INR 1.51*         Recent Labs     04/02/24  0251   TRIGLYCERIDE 90   HDL 32*   LDL 88       Imaging  EC-ECHOCARDIOGRAM COMPLETE W/O CONT   Final  Result      DX-CHEST-PORTABLE (1 VIEW)   Final Result         1.  Scattered hazy bilateral pulmonary infiltrates, greatest in the right upper lobe.   2.  Trace bilateral pleural effusions   3.  Cardiomegaly   4.  Atherosclerosis           Assessment/Plan  * LV (left ventricular) mural thrombus  Assessment & Plan  Was on heparin drip   Will switch to eliquis with loading dose  Close monitoring on tele   Cardiology following case discussed appreciate rec.       Systolic HF (heart failure) (HCC)  Assessment & Plan  Cardiology following and case discussed , now on GDMT  Wean off O2   Appreciate rec.     History of pulmonary embolus (PE)- (present on admission)  Assessment & Plan  Patient denies any history of any blood clot in his lungs and is on apixaban  He stopped taking all his meds.  CTA at outside facility negative for PE  Outpatient follow-up    Pneumonia of both upper lobes due to infectious organism- (present on admission)  Assessment & Plan  Patient feeling shortness of breath, has had a productive cough for the past 4 days.  Denies fever/chills  Respiratory panel, procalcitonin ordered  He was started on antibiotics at outside facility, will continue for now    Dyslipidemia- (present on admission)  Assessment & Plan  He stopped taking his meds.  Restart atorvastatin    Coronary artery disease involving native coronary artery of native heart without angina pectoris- (present on admission)  Assessment & Plan  Said he had 4 stents placed 2022.  Has not been on any of his medications  Restart aspirin    Essential hypertension- (present on admission)  Assessment & Plan  Stop taking all his medications few months ago because he could not follow his physician  Restart metoprolol.  As needed antihypertensives ordered  Monitor and adjust as needed     Acute respiratory failure with hypoxia (HCC)- (present on admission)  Assessment & Plan  Requiring fluid on my evaluation, desaturating on oxygen was lowered  Possible  related to systolic HF now on lasix also concern for pneumonia cont on abx switching to Augmentin  Wean off O 2 as tolerated          VTE prophylaxis: Eliquis     Greater than 51 minutes spent prepping to see patient (e.g. review of tests) obtaining and/or reviewing separately obtained history. Performing a medically appropriate examination and/ evaluation.  Counseling and educating the patient/family/caregiver.  Ordering medications, tests, or procedures.  Referring and communicating with other health care professionals.  Documenting clinical information in EPIC.  Independently interpreting results and communicating results to patient/family/caregiver.  Care coordination.      I have performed a physical exam and reviewed and updated ROS and Plan today (4/3/2024). In review of yesterday's note (4/2/2024), there are no changes except as documented above.

## 2024-04-03 NOTE — PROGRESS NOTES
EP Progress Note    Asked by cardiology service to review ICD tachy therapy programming. Dual chamber ICD implanted by Dr. Castro. Single VF zone at 188 bpm. Looking through chart no sustained MMVT episodes with longer CL than this requiring treatment, in particular ATP which would be the main difference if VT zones were turned on (say VT from 188 to 250 bpm). Given how long his EF is and likely poor tolerance of hemodynamic VT, ATP during charge and straight to shock not unreasonable way to program. Recommend no changes.    Boone Hess MD

## 2024-04-03 NOTE — DISCHARGE PLANNING
Case Management Discharge Planning    Admission Date: 4/2/2024  GMLOS: 4.1  ALOS: 1    6-Clicks ADL Score: 24  6-Clicks Mobility Score: 23      Anticipated Discharge Dispo: Discharge Disposition: Discharged to home/self care (01)    DME Needed: No    Action(s) Taken: Updated Provider/Nurse on Discharge Plan and DC Assessment Complete (See below)    Escalations Completed: None    Medically Clear: No    Next Steps: Anticipate DC to home in [place]. Transportation home may be a potential issue.    Barriers to Discharge: Medical clearance and Transportation    Is the patient up for discharge tomorrow: No

## 2024-04-03 NOTE — ASSESSMENT & PLAN NOTE
Was on heparin drip   Will switch to eliquis with loading dose  Close monitoring on tele   Cardiology following case discussed appreciate rec.

## 2024-04-03 NOTE — PROGRESS NOTES
Cardiology Follow-up Consult Note    Date of Service:    4/3/2024      Consulting Physician: Avani Mcbride M.D.    Name:   Ministerio Tenorio     YOB: 1955  Age:   68 y.o.  male   MRN:   [id number]      Subjective:  Patient reports he is feeling better.  Breathing is better.    All other review of systems reviewed and negative.    Past medical, surgical, social, and family history reviewed and unchanged from admission except as noted in assessment and plan.    No medications prior to admission.     Current Facility-Administered Medications   Medication Dose Frequency Provider Last Rate Last Admin    acetaminophen (Tylenol) tablet 650 mg  650 mg Q6HRS PRN Melita Palomares M.D.   650 mg at 04/02/24 2017    labetalol (Normodyne/Trandate) injection 10 mg  10 mg Q4HRS PRN Melita Palomares M.D.        aspirin EC tablet 81 mg  81 mg DAILY Melita Palomares M.D.   81 mg at 04/03/24 0538    atorvastatin (Lipitor) tablet 40 mg  40 mg Q EVENING Melita Palomares M.D.   40 mg at 04/02/24 1732    ampicillin/sulbactam (Unasyn) 3 g in  mL IVPB  3 g Q6HRS Melita Palomares M.D.   Stopped at 04/03/24 0651    azithromycin (Zithromax) tablet 500 mg  500 mg DAILY Melita Palomares M.D.   500 mg at 04/03/24 0538    carvedilol (Coreg) tablet 6.25 mg  6.25 mg BID WITH MEALS Alejandro Patiño M.D.   6.25 mg at 04/02/24 1732    heparin infusion 25,000 units in 500 mL 0.45% NACL  0-30 Units/kg/hr Continuous Alejandro Patiño M.D. 23.2 mL/hr at 04/03/24 0704 16 Units/kg/hr at 04/03/24 0704    heparin injection 2,900 Units  40 Units/kg PRN Alejandro Patiño M.D.        furosemide (Lasix) injection 20 mg  20 mg BID DIURETIC Alejandro Patiño M.D.   20 mg at 04/03/24 0538    spironolactone (Aldactone) tablet 25 mg  25 mg Q DAY Alejandro Patiño M.D.   25 mg at 04/03/24 0538    dapagliflozin propanediol (Farxiga) tablet 10 mg  10 mg DAILY Alejandro Patiño M.D.   10 mg at 04/03/24 0538    losartan (Cozaar) tablet 25 mg  25 mg DAILY AT 1800 Alejandro Patiño M.D.   25 mg at  04/02/24 1732    scopolamine (Transderm-Scop) patch 1 Patch  1 Patch Q72HRS SOLEDAD MedinaRAnnaliseN.   1 Patch at 04/02/24 2018    LORazepam (Ativan) injection 0.5 mg  0.5 mg Q4HRS PRN SOLEDAD MedinaRAnnaliseN.   0.5 mg at 04/02/24 2018   Last reviewed on 4/2/2024  8:59 AM by Shawnee Thompson R.N.     No Known Allergies      Intake/Output Summary (Last 24 hours) at 4/3/2024 0742  Last data filed at 4/3/2024 0728  Gross per 24 hour   Intake 580 ml   Output 2183 ml   Net -1603 ml        Physical Exam  Body mass index is 21.7 kg/m².  /70   Pulse 80   Temp 36.3 °C (97.3 °F) (Temporal)   Resp 17   Ht 1.829 m (6')   Wt 72.6 kg (160 lb)   SpO2 97%   Vitals:    04/02/24 1629 04/02/24 1732 04/03/24 0339 04/03/24 0728   BP: (!) 140/86 139/88 120/71 120/70   Pulse: 94 93 77 80   Resp: 16  17 17   Temp: 36.6 °C (97.9 °F)  36.2 °C (97.2 °F) 36.3 °C (97.3 °F)   TempSrc: Temporal  Temporal Temporal   SpO2: 99%  96% 97%   Weight:       Height:         Oxygen Therapy:  Pulse Oximetry: 97 %, O2 (LPM): 3, O2 Delivery Device: Nasal Cannula    Physical Exam  Constitutional:       Appearance: Normal appearance.   Neck:      Vascular: No JVD.   Cardiovascular:      Rate and Rhythm: Normal rate and regular rhythm.      Pulses: Normal pulses and intact distal pulses.      Heart sounds: Normal heart sounds, S1 normal and S2 normal. No murmur heard.     No friction rub. No S3 or S4 sounds.   Pulmonary:      Effort: Pulmonary effort is normal. No respiratory distress.      Breath sounds: Normal breath sounds. No wheezing or rales.   Musculoskeletal:      Cervical back: Neck supple.   Skin:     General: Skin is warm and dry.   Neurological:      Mental Status: He is alert.         Labs (personally reviewed and notable for):   Lab Results   Component Value Date/Time    SODIUM 138 04/03/2024 01:35 AM    POTASSIUM 3.3 (L) 04/03/2024 01:35 AM    CHLORIDE 103 04/03/2024 01:35 AM    CO2 23 04/03/2024 01:35 AM    GLUCOSE 117 (H)  2024 01:35 AM    BUN 32 (H) 2024 01:35 AM    CREATININE 1.13 2024 01:35 AM      Lab Results   Component Value Date/Time    WBC 9.2 2024 01:35 AM    RBC 4.38 (L) 2024 01:35 AM    HEMOGLOBIN 13.3 (L) 2024 01:35 AM    HEMATOCRIT 40.3 (L) 2024 01:35 AM    MCV 92.0 2024 01:35 AM    MCH 30.4 2024 01:35 AM    MCHC 33.0 2024 01:35 AM    MPV 11.2 2024 01:35 AM    NEUTSPOLYS 74.00 (H) 2024 02:55 AM    LYMPHOCYTES 18.30 (L) 2024 02:55 AM    MONOCYTES 7.20 2024 02:55 AM    EOSINOPHILS 0.10 2024 02:55 AM    BASOPHILS 0.20 2024 02:55 AM      Lab Results   Component Value Date/Time    CHOLSTRLTOT 138 2024 02:51 AM    LDL 88 2024 02:51 AM    HDL 32 (A) 2024 02:51 AM    TRIGLYCERIDE 90 2024 02:51 AM       Lab Results   Component Value Date/Time    TROPONINT 29 (H) 2024 1439     Lab Results   Component Value Date/Time    NTPROBNP 31643 (H) 2024 0255       Telemetry Reviewed with Monitor Tech: sinus, occasional PVCs and PACs    Radiology test Review:  EC-ECHOCARDIOGRAM COMPLETE W/O CONT   Final Result      DX-CHEST-PORTABLE (1 VIEW)   Final Result         1.  Scattered hazy bilateral pulmonary infiltrates, greatest in the right upper lobe.   2.  Trace bilateral pleural effusions   3.  Cardiomegaly   4.  Atherosclerosis          Impression and Medical Decision Makin.  Heart failure with reduced ejection fraction EF less than 20%.  Patient has a history of noncompliance and had stopped his medications 7 to 8 months prior to admission.  He reports he is going to be compliant at this time.  - Increase carvedilol to 12.5 mg p.o. twice daily (this was what he was on prior)  - Continue with losartan 25 mg p.o. daily  - Continue with spironolactone 25 mg p.o. daily and Farxiga.  - Change IV Lasix over to Lasix 40 mg p.o. every morning  - Follow-up with heart failure clinic in [place].  Patient reports she  will stay in renal for now.    2.  [employer] dual-chamber AICD was interrogated yesterday with normal function.  Patient does have parameter set up for just VF over 188 bpm.  There is no VT zone.  This may be fine.  He has had some nonsustained VT noted.  Will just double check with the EP that the settings are appropriate.  He will need follow-up also in device clinic on a routine basis.  Patient expressed understanding.    3.  LV apical thrombus.  Please see my prior note.  Can use Eliquis for LV apical thrombus.    4.  History of MI and coronary artery disease with stents in the LAD with no reflow phenomenon.  As we are just starting back on Eliquis, would hold off starting on aspirin at this time.  Can consider as an outpatient once patient is stable on his current medications.    Cardiology will sign off at this time.  Please call with any questions or change in clinical status.    Saloni Joe MD  Cardiologist, [facility]    Please note that this dictation was created using voice recognition software. I have made every reasonable attempt to correct obvious errors, but it is possible there are errors of grammar and possibly content that I did not discover before finalizing the note.

## 2024-04-03 NOTE — CARE PLAN
Problem: Knowledge Deficit - Standard  Goal: Patient and family/care givers will demonstrate understanding of plan of care, disease process/condition, diagnostic tests and medications  Outcome: Progressing     Problem: Respiratory  Goal: Patient will achieve/maintain optimum respiratory ventilation and gas exchange  Outcome: Progressing     Problem: Self Care  Goal: Patient will have the ability to perform ADLs independently or with assistance (bathe, groom, dress, toilet and feed)  Outcome: Progressing     Problem: Fall Risk  Goal: Patient will remain free from falls  Outcome: Progressing   The patient is Stable - Low risk of patient condition declining or worsening    Shift Goals  Clinical Goals: up to chair, I&Os, heparin drip  Patient Goals: feel better  Family Goals: daisy    Progress made toward(s) clinical / shift goals:  yes    Patient is not progressing towards the following goals:

## 2024-04-03 NOTE — CARE PLAN
The patient is Stable - Low risk of patient condition declining or worsening    Shift Goals  Clinical Goals: hemodynamic stability, heparin gtt  Patient Goals: sleep  Family Goals: daisy    Progress made toward(s) clinical / shift goals:    Problem: Pain - Standard  Goal: Alleviation of pain or a reduction in pain to the patient’s comfort goal  Outcome: Progressing     Problem: Knowledge Deficit - Standard  Goal: Patient and family/care givers will demonstrate understanding of plan of care, disease process/condition, diagnostic tests and medications  Outcome: Progressing     Problem: Respiratory  Goal: Patient will achieve/maintain optimum respiratory ventilation and gas exchange  Outcome: Progressing     Problem: Hemodynamics - Pneumonia  Goal: Patient's hemodynamics, fluid balance and neurologic status will be stable or improve  Outcome: Progressing       Patient is not progressing towards the following goals:

## 2024-04-03 NOTE — DISCHARGE PLANNING
Probable discharge to home tomorrow with Eliquis. Pt's insurance is [employer]. [facility] and spoke to Brice. She said they are not contracted with this insurance but can use manufacture's coupon for 30 day supply at no cost to pt.

## 2024-04-03 NOTE — PROGRESS NOTES
4 Eyes Skin Assessment Completed by Mariam GAN, RN and Monica RICHARDS RN.    Head WDL  Ears WDL  Nose WDL  Mouth WDL  Neck WDL  Breast/Chest WDL  Shoulder Blades WDL  Spine WDL  (R) Arm/Elbow/Hand Redness and Blanching  (L) Arm/Elbow/Hand Redness and Blanching  Abdomen WDL  Groin WDL  Scrotum/Coccyx/Buttocks WDL  (R) Leg WDL  (L) Leg WDL  (R) Heel/Foot/Toe WDL  (L) Heel/Foot/Toe WDL          Devices In Places Tele Box, Blood Pressure Cuff, and Pulse Ox      Interventions In Place Pillows    Possible Skin Injury No    Pictures Uploaded Into Epic N/A  Wound Consult Placed N/A  RN Wound Prevention Protocol Ordered No

## 2024-04-03 NOTE — DISCHARGE PLANNING
In the case of an emergency, pt's legal NOK is Spouse, Mishel Puente [phone]    RNCM met with pt at bedside and obtained the information used in this assessment. Pt verified accuracy of facesheet. Pt lives in a single story home with Spouse in [place], [place].  Pt uses [facility]. Prior to current hospitalization, pt was completely independent in ADLS/IADLS although has a FWW at home that he uses as needed. Pt drives and is able to attend necessary MD appointments. Pt has a good support system. Pt denies any hx of substance use and denies any dx of mh.    Care Transition Team Assessment    Information Source: Patient  Orientation Level: Oriented X4  Information Given By: Patient  Informant's Name: Ministerio  Who is responsible for making decisions for patient? : Patient         Elopement Risk  Legal Hold: No  Ambulatory or Self Mobile in Wheelchair: Yes  Disoriented: No  Psychiatric Symptoms: None  History of Wandering: No  Elopement this Admit: No  Vocalizing Wanting to Leave: No  Displays Behaviors, Body Language Wanting to Leave: No-Not at Risk for Elopement  Elopement Risk: Not at Risk for Elopement    Interdisciplinary Discharge Planning  Does Admitting Nurse Feel This Could be a Complex Discharge?: No  Primary Care Physician: None  Lives with - Patient's Self Care Capacity: Spouse  Patient or legal guardian wants to designate a caregiver: No  Support Systems: Family Member(s)  Housing / Facility: [address]  Do You Take your Prescribed Medications Regularly: Yes  Able to Return to Previous ADL's: Yes  Mobility Issues: No  Prior Services: Home-Independent, None  Patient Prefers to be Discharged to:: Home  Assistance Needed: No  Durable Medical Equipment: Walker    Discharge Preparedness  What is your plan after discharge?: Home with help  What are your discharge supports?: Spouse  Prior Functional Level: Ambulatory, Independent with Activities of Daily Living, Independent with Medication  Management  Difficulity with ADLs: None  Difficulity with IADLs: None    Functional Assesment  Prior Functional Level: Ambulatory, Independent with Activities of Daily Living, Independent with Medication Management    Finances  Financial Barriers to Discharge: No  Prescription Coverage: Yes    Vision / Hearing Impairment  Vision Impairment : Yes  Right Eye Vision: Wears Glasses  Left Eye Vision: Wears Glasses  Hearing Impairment : No         Advance Directive  Advance Directive?: None    Domestic Abuse  Have you ever been the victim of abuse or violence?: No  Physical Abuse or Sexual Abuse: No  Verbal Abuse or Emotional Abuse: No  Possible Abuse/Neglect Reported to:: Not Applicable    Psychological Assessment  History of Substance Abuse: None  History of Psychiatric Problems: No    Discharge Risks or Barriers  Discharge risks or barriers?: No PCP    Anticipated Discharge Information  Discharge Disposition: Discharged to home/self care (01)

## 2024-04-04 LAB
ANION GAP SERPL CALC-SCNC: 12 MMOL/L (ref 7–16)
BUN SERPL-MCNC: 30 MG/DL (ref 8–22)
CALCIUM SERPL-MCNC: 8.5 MG/DL (ref 8.5–10.5)
CHLORIDE SERPL-SCNC: 103 MMOL/L (ref 96–112)
CO2 SERPL-SCNC: 22 MMOL/L (ref 20–33)
CREAT SERPL-MCNC: 1.17 MG/DL (ref 0.5–1.4)
ERYTHROCYTE [DISTWIDTH] IN BLOOD BY AUTOMATED COUNT: 43.8 FL (ref 35.9–50)
GFR SERPLBLD CREATININE-BSD FMLA CKD-EPI: 68 ML/MIN/1.73 M 2
GLUCOSE SERPL-MCNC: 106 MG/DL (ref 65–99)
HCT VFR BLD AUTO: 41.5 % (ref 42–52)
HGB BLD-MCNC: 13.7 G/DL (ref 14–18)
MCH RBC QN AUTO: 30.6 PG (ref 27–33)
MCHC RBC AUTO-ENTMCNC: 33 G/DL (ref 32.3–36.5)
MCV RBC AUTO: 92.6 FL (ref 81.4–97.8)
PLATELET # BLD AUTO: 216 K/UL (ref 164–446)
PMV BLD AUTO: 11.6 FL (ref 9–12.9)
POTASSIUM SERPL-SCNC: 4 MMOL/L (ref 3.6–5.5)
RBC # BLD AUTO: 4.48 M/UL (ref 4.7–6.1)
SODIUM SERPL-SCNC: 137 MMOL/L (ref 135–145)
WBC # BLD AUTO: 7.7 K/UL (ref 4.8–10.8)

## 2024-04-04 PROCEDURE — A9270 NON-COVERED ITEM OR SERVICE: HCPCS | Performed by: STUDENT IN AN ORGANIZED HEALTH CARE EDUCATION/TRAINING PROGRAM

## 2024-04-04 PROCEDURE — 80048 BASIC METABOLIC PNL TOTAL CA: CPT

## 2024-04-04 PROCEDURE — 700102 HCHG RX REV CODE 250 W/ 637 OVERRIDE(OP): Performed by: STUDENT IN AN ORGANIZED HEALTH CARE EDUCATION/TRAINING PROGRAM

## 2024-04-04 PROCEDURE — 770020 HCHG ROOM/CARE - TELE (206)

## 2024-04-04 PROCEDURE — A9270 NON-COVERED ITEM OR SERVICE: HCPCS | Performed by: INTERNAL MEDICINE

## 2024-04-04 PROCEDURE — A9270 NON-COVERED ITEM OR SERVICE: HCPCS

## 2024-04-04 PROCEDURE — 700102 HCHG RX REV CODE 250 W/ 637 OVERRIDE(OP): Performed by: INTERNAL MEDICINE

## 2024-04-04 PROCEDURE — 36415 COLL VENOUS BLD VENIPUNCTURE: CPT

## 2024-04-04 PROCEDURE — 99233 SBSQ HOSP IP/OBS HIGH 50: CPT | Performed by: INTERNAL MEDICINE

## 2024-04-04 PROCEDURE — 700111 HCHG RX REV CODE 636 W/ 250 OVERRIDE (IP)

## 2024-04-04 PROCEDURE — 85027 COMPLETE CBC AUTOMATED: CPT

## 2024-04-04 PROCEDURE — 700102 HCHG RX REV CODE 250 W/ 637 OVERRIDE(OP)

## 2024-04-04 PROCEDURE — 97166 OT EVAL MOD COMPLEX 45 MIN: CPT

## 2024-04-04 RX ORDER — ALPRAZOLAM 0.25 MG/1
0.25 TABLET ORAL 2 TIMES DAILY PRN
Status: DISCONTINUED | OUTPATIENT
Start: 2024-04-04 | End: 2024-04-09 | Stop reason: HOSPADM

## 2024-04-04 RX ORDER — FUROSEMIDE 10 MG/ML
20 INJECTION INTRAMUSCULAR; INTRAVENOUS ONCE
Status: COMPLETED | OUTPATIENT
Start: 2024-04-04 | End: 2024-04-04

## 2024-04-04 RX ADMIN — LORAZEPAM 0.5 MG: 2 INJECTION, SOLUTION INTRAMUSCULAR; INTRAVENOUS at 02:33

## 2024-04-04 RX ADMIN — FUROSEMIDE 20 MG: 10 INJECTION INTRAMUSCULAR; INTRAVENOUS at 05:50

## 2024-04-04 RX ADMIN — ATORVASTATIN CALCIUM 40 MG: 40 TABLET, FILM COATED ORAL at 17:05

## 2024-04-04 RX ADMIN — LOSARTAN POTASSIUM 25 MG: 25 TABLET, FILM COATED ORAL at 17:05

## 2024-04-04 RX ADMIN — FUROSEMIDE 40 MG: 40 TABLET ORAL at 04:39

## 2024-04-04 RX ADMIN — CARVEDILOL 12.5 MG: 12.5 TABLET, FILM COATED ORAL at 17:05

## 2024-04-04 RX ADMIN — APIXABAN 10 MG: 5 TABLET, FILM COATED ORAL at 17:05

## 2024-04-04 RX ADMIN — ASPIRIN 81 MG: 81 TABLET, COATED ORAL at 04:39

## 2024-04-04 RX ADMIN — APIXABAN 10 MG: 5 TABLET, FILM COATED ORAL at 04:39

## 2024-04-04 RX ADMIN — ACETAMINOPHEN 650 MG: 325 TABLET, FILM COATED ORAL at 04:57

## 2024-04-04 RX ADMIN — DAPAGLIFLOZIN 10 MG: 10 TABLET, FILM COATED ORAL at 04:40

## 2024-04-04 RX ADMIN — AMOXICILLIN AND CLAVULANATE POTASSIUM 1 TABLET: 875; 125 TABLET, FILM COATED ORAL at 17:05

## 2024-04-04 RX ADMIN — CARVEDILOL 12.5 MG: 12.5 TABLET, FILM COATED ORAL at 07:40

## 2024-04-04 RX ADMIN — AZITHROMYCIN DIHYDRATE 500 MG: 250 TABLET, FILM COATED ORAL at 04:39

## 2024-04-04 RX ADMIN — AMOXICILLIN AND CLAVULANATE POTASSIUM 1 TABLET: 875; 125 TABLET, FILM COATED ORAL at 04:40

## 2024-04-04 RX ADMIN — SPIRONOLACTONE 25 MG: 25 TABLET ORAL at 04:40

## 2024-04-04 ASSESSMENT — COGNITIVE AND FUNCTIONAL STATUS - GENERAL
CLIMB 3 TO 5 STEPS WITH RAILING: A LOT
SUGGESTED CMS G CODE MODIFIER MOBILITY: CK
DRESSING REGULAR LOWER BODY CLOTHING: A LITTLE
TOILETING: A LITTLE
PERSONAL GROOMING: A LITTLE
DRESSING REGULAR LOWER BODY CLOTHING: A LOT
SUGGESTED CMS G CODE MODIFIER DAILY ACTIVITY: CK
MOBILITY SCORE: 16
WALKING IN HOSPITAL ROOM: A LOT
HELP NEEDED FOR BATHING: A LITTLE
DRESSING REGULAR UPPER BODY CLOTHING: A LOT
EATING MEALS: A LITTLE
PERSONAL GROOMING: A LITTLE
MOVING TO AND FROM BED TO CHAIR: A LOT
MOVING FROM LYING ON BACK TO SITTING ON SIDE OF FLAT BED: A LITTLE
HELP NEEDED FOR BATHING: A LOT
EATING MEALS: A LITTLE
TOILETING: A LITTLE
STANDING UP FROM CHAIR USING ARMS: A LITTLE
SUGGESTED CMS G CODE MODIFIER DAILY ACTIVITY: CK
DAILY ACTIVITIY SCORE: 15
DAILY ACTIVITIY SCORE: 18
DRESSING REGULAR UPPER BODY CLOTHING: A LITTLE

## 2024-04-04 ASSESSMENT — ENCOUNTER SYMPTOMS
ABDOMINAL PAIN: 0
DIARRHEA: 0
COUGH: 1
SHORTNESS OF BREATH: 1
CHILLS: 0
NAUSEA: 0
FEVER: 0
VOMITING: 0

## 2024-04-04 ASSESSMENT — PAIN DESCRIPTION - PAIN TYPE: TYPE: ACUTE PAIN

## 2024-04-04 ASSESSMENT — PATIENT HEALTH QUESTIONNAIRE - PHQ9
SUM OF ALL RESPONSES TO PHQ9 QUESTIONS 1 AND 2: 0
SUM OF ALL RESPONSES TO PHQ9 QUESTIONS 1 AND 2: 0
1. LITTLE INTEREST OR PLEASURE IN DOING THINGS: NOT AT ALL
2. FEELING DOWN, DEPRESSED, IRRITABLE, OR HOPELESS: NOT AT ALL
2. FEELING DOWN, DEPRESSED, IRRITABLE, OR HOPELESS: NOT AT ALL
1. LITTLE INTEREST OR PLEASURE IN DOING THINGS: NOT AT ALL

## 2024-04-04 ASSESSMENT — ACTIVITIES OF DAILY LIVING (ADL): TOILETING: INDEPENDENT

## 2024-04-04 NOTE — DISCHARGE PLANNING
Pt will need home 02. Lives in [place], [place], insurance is [employer]age. DPA to send referral to [facility].

## 2024-04-04 NOTE — CARE PLAN
The patient is Stable - Low risk of patient condition declining or worsening    Shift Goals  Clinical Goals: Hemodynamic stability, eliquis, abx  Patient Goals: rest, safety  Family Goals: TOSIN    Progress made toward(s) clinical / shift goals:      Problem: Respiratory  Goal: Patient will achieve/maintain optimum respiratory ventilation and gas exchange  Outcome: Progressing     Problem: Knowledge Deficit - Standard  Goal: Patient and family/care givers will demonstrate understanding of plan of care, disease process/condition, diagnostic tests and medications  Outcome: Progressing    Problem: Self Care  Goal: Patient will have the ability to perform ADLs independently or with assistance (bathe, groom, dress, toilet and feed)  Outcome: Progressing    Patient is not progressing towards the following goals:

## 2024-04-04 NOTE — PROGRESS NOTES
0500- Pt with intermittent periods of SOB/tachypnea at rest, spo2 dipping into 80's on 3L O2. Appears orthopneic. On call hospitalist notified. Continue to monitor with administration of oral diuretics.    0545- Hospitalist notified of continued SOB at rest. Pt labored and stating he cannot get enough air. 20mg IV lasix ordered and administered stat.

## 2024-04-04 NOTE — PROGRESS NOTES
Report received from RN, pt care assumed, tele box on. VSS, pt assessment complete. Pt AAOx4, no signs of distress noted at this time. POC discussed with pt and verbalizes no questions. Pt denies any additional needs at this time. Bed in lowest position, bed alarm on, pt educated on fall risk and verbalized understanding, call light within reach, hourly rounding initiated.

## 2024-04-04 NOTE — DISCHARGE PLANNING
1215  Received Choice form at 1036  Agency/Facility Name: [facility]  Referral sent per Choice form @ not sent, need O2 order and F2F.

## 2024-04-04 NOTE — PROGRESS NOTES
Hospital Medicine Daily Progress Note    Date of Service  4/4/2024    Chief Complaint  Ministerio Tenorio is a 68 y.o. male admitted 4/2/2024 with chest pain and SOB      Hospital Course  This is a 68 y.o. male who was transferred from outside facility 4/2/2024 with shortness of breath and chest pain.  PMH of CAD s/p 4 stents in 2022, s/p AICD, hypertension, dyslipidemia, PE.  Patient said about 7 or 8 months ago he stopped taking all his medications because he could not reach his doctors in [place].  For the past 4 days he has had shortness of breath, right-sided sharp chest pain that came on at rest, productive cough.  Denies fever/chills, no hemoptysis.   He was found to have an elevated BNP concern for HF, echo showed EF of 15% and cardiology was consulted.  Patient admitted for further treatment     Interval Problem Update  Patient seen and examined, afebrile, resting in bed, denies chest pain, seen by cardiology and case discussed, now GDMT also on lasix,   Regarding his LV thrombus, started on initially on heparin héctor switch to eliquis  Wean off O2 as tolerated   4/4: Patient resting in bed, afebrile, still with SOB, on 4L of oxygen   Cont on Lasix and also on eliquis for his LV thrombus  Wean off O2 as tolerated   I have discussed this patient's plan of care and discharge plan at IDT rounds today with Case Management, Nursing, Nursing leadership, and other members of the IDT team.    Consultants/Specialty  cardiology    Code Status  Full Code    Disposition  The patient is not medically cleared for discharge to home or a post-acute facility.      I have placed the appropriate orders for post-discharge needs.    Review of Systems  Review of Systems   Constitutional:  Negative for chills and fever.   Respiratory:  Positive for cough and shortness of breath.    Cardiovascular:  Negative for chest pain.   Gastrointestinal:  Negative for abdominal pain, diarrhea, nausea and vomiting.   Genitourinary:  Negative  for dysuria and urgency.        Physical Exam  Temp:  [36.4 °C (97.5 °F)-36.7 °C (98.1 °F)] 36.4 °C (97.5 °F)  Pulse:  [65-88] 66  Resp:  [18-24] 18  BP: (100-136)/(61-93) 115/63  SpO2:  [82 %-98 %] 93 %    Physical Exam  Constitutional:       Appearance: Normal appearance.   HENT:      Head: Normocephalic and atraumatic.      Mouth/Throat:      Mouth: Mucous membranes are moist.      Pharynx: Oropharynx is clear. No oropharyngeal exudate or posterior oropharyngeal erythema.   Eyes:      General: No scleral icterus.  Cardiovascular:      Rate and Rhythm: Normal rate and regular rhythm.      Pulses: Normal pulses.      Heart sounds: Normal heart sounds. No murmur heard.  Pulmonary:      Effort: Pulmonary effort is normal. No respiratory distress.      Breath sounds: Normal breath sounds. No wheezing.   Abdominal:      Palpations: Abdomen is soft.      Tenderness: There is no abdominal tenderness.   Musculoskeletal:         General: No swelling or tenderness. Normal range of motion.      Cervical back: Normal range of motion.   Skin:     General: Skin is warm and dry.   Neurological:      General: No focal deficit present.      Mental Status: He is alert and oriented to person, place, and time. Mental status is at baseline.   Psychiatric:         Mood and Affect: Mood normal.         Fluids    Intake/Output Summary (Last 24 hours) at 4/4/2024 1400  Last data filed at 4/4/2024 0900  Gross per 24 hour   Intake 920 ml   Output 1175 ml   Net -255 ml       Laboratory  Recent Labs     04/02/24  0255 04/03/24  0135 04/04/24  0012   WBC 9.5 9.2 7.7   RBC 4.91 4.38* 4.48*   HEMOGLOBIN 15.5 13.3* 13.7*   HEMATOCRIT 44.4 40.3* 41.5*   MCV 90.4 92.0 92.6   MCH 31.6 30.4 30.6   MCHC 34.9 33.0 33.0   RDW 42.8 42.8 43.8   PLATELETCT 246 210 216   MPV 10.9 11.2 11.6     Recent Labs     04/02/24  0255 04/03/24  0135 04/04/24  0012   SODIUM 142 138 137   POTASSIUM 3.6 3.3* 4.0   CHLORIDE 105 103 103   CO2 22 23 22   GLUCOSE 89 117*  106*   BUN 34* 32* 30*   CREATININE 1.24 1.13 1.17   CALCIUM 8.7 8.1* 8.5     Recent Labs     04/02/24  1439   APTT 30.0   INR 1.51*         Recent Labs     04/02/24  0251   TRIGLYCERIDE 90   HDL 32*   LDL 88       Imaging  EC-ECHOCARDIOGRAM COMPLETE W/O CONT   Final Result      DX-CHEST-PORTABLE (1 VIEW)   Final Result         1.  Scattered hazy bilateral pulmonary infiltrates, greatest in the right upper lobe.   2.  Trace bilateral pleural effusions   3.  Cardiomegaly   4.  Atherosclerosis           Assessment/Plan  * LV (left ventricular) mural thrombus  Assessment & Plan  Was on heparin drip   Will switch to eliquis with loading dose  Close monitoring on tele   Cardiology following case discussed appreciate rec.       Systolic HF (heart failure) (HCC)  Assessment & Plan  Cardiology following and case discussed , now on GDMT  Wean off O2   Appreciate rec.     History of pulmonary embolus (PE)- (present on admission)  Assessment & Plan  Patient denies any history of any blood clot in his lungs and is on apixaban  He stopped taking all his meds.  CTA at outside facility negative for PE  Outpatient follow-up    Pneumonia of both upper lobes due to infectious organism- (present on admission)  Assessment & Plan  Patient feeling shortness of breath, has had a productive cough for the past 4 days.  Denies fever/chills  Respiratory panel, procalcitonin ordered  He was started on antibiotics at outside facility, will continue for now    Dyslipidemia- (present on admission)  Assessment & Plan  He stopped taking his meds.  Restart atorvastatin    Coronary artery disease involving native coronary artery of native heart without angina pectoris- (present on admission)  Assessment & Plan  Said he had 4 stents placed 2022.  Has not been on any of his medications  Restart aspirin    Essential hypertension- (present on admission)  Assessment & Plan  Stop taking all his medications few months ago because he could not follow his  physician  Restart metoprolol.  As needed antihypertensives ordered  Monitor and adjust as needed     Acute respiratory failure with hypoxia (HCC)- (present on admission)  Assessment & Plan  Requiring fluid on my evaluation, desaturating on oxygen was lowered  Possible related to systolic HF now on lasix also concern for pneumonia cont on abx switching to Augmentin  Wean off O 2 as tolerated          VTE prophylaxis: Eliquis       I have performed a physical exam and reviewed and updated ROS and Plan today (4/4/2024). In review of yesterday's note (4/3/2024), there are no changes except as documented above.    Greater than 51 minutes spent prepping to see patient (e.g. review of tests) obtaining and/or reviewing separately obtained history. Performing a medically appropriate examination and/ evaluation.  Counseling and educating the patient/family/caregiver.  Ordering medications, tests, or procedures.  Referring and communicating with other health care professionals.  Documenting clinical information in EPIC.  Independently interpreting results and communicating results to patient/family/caregiver.  Care coordination.

## 2024-04-05 LAB
ANION GAP SERPL CALC-SCNC: 12 MMOL/L (ref 7–16)
BUN SERPL-MCNC: 24 MG/DL (ref 8–22)
CALCIUM SERPL-MCNC: 8.7 MG/DL (ref 8.5–10.5)
CHLORIDE SERPL-SCNC: 103 MMOL/L (ref 96–112)
CO2 SERPL-SCNC: 22 MMOL/L (ref 20–33)
CREAT SERPL-MCNC: 1.15 MG/DL (ref 0.5–1.4)
ERYTHROCYTE [DISTWIDTH] IN BLOOD BY AUTOMATED COUNT: 42.2 FL (ref 35.9–50)
GFR SERPLBLD CREATININE-BSD FMLA CKD-EPI: 69 ML/MIN/1.73 M 2
GLUCOSE SERPL-MCNC: 102 MG/DL (ref 65–99)
HCT VFR BLD AUTO: 41 % (ref 42–52)
HCT VFR BLD AUTO: 43.7 % (ref 42–52)
HGB BLD-MCNC: 13.9 G/DL (ref 14–18)
HGB BLD-MCNC: 14.9 G/DL (ref 14–18)
MCH RBC QN AUTO: 31.2 PG (ref 27–33)
MCHC RBC AUTO-ENTMCNC: 34.1 G/DL (ref 32.3–36.5)
MCV RBC AUTO: 91.4 FL (ref 81.4–97.8)
PLATELET # BLD AUTO: 253 K/UL (ref 164–446)
PMV BLD AUTO: 11.2 FL (ref 9–12.9)
POTASSIUM SERPL-SCNC: 4 MMOL/L (ref 3.6–5.5)
RBC # BLD AUTO: 4.78 M/UL (ref 4.7–6.1)
SODIUM SERPL-SCNC: 137 MMOL/L (ref 135–145)
WBC # BLD AUTO: 9.6 K/UL (ref 4.8–10.8)

## 2024-04-05 PROCEDURE — A9270 NON-COVERED ITEM OR SERVICE: HCPCS | Performed by: INTERNAL MEDICINE

## 2024-04-05 PROCEDURE — 80048 BASIC METABOLIC PNL TOTAL CA: CPT

## 2024-04-05 PROCEDURE — A9270 NON-COVERED ITEM OR SERVICE: HCPCS | Performed by: STUDENT IN AN ORGANIZED HEALTH CARE EDUCATION/TRAINING PROGRAM

## 2024-04-05 PROCEDURE — 700102 HCHG RX REV CODE 250 W/ 637 OVERRIDE(OP): Performed by: INTERNAL MEDICINE

## 2024-04-05 PROCEDURE — 700102 HCHG RX REV CODE 250 W/ 637 OVERRIDE(OP): Performed by: STUDENT IN AN ORGANIZED HEALTH CARE EDUCATION/TRAINING PROGRAM

## 2024-04-05 PROCEDURE — A9270 NON-COVERED ITEM OR SERVICE: HCPCS

## 2024-04-05 PROCEDURE — 85027 COMPLETE CBC AUTOMATED: CPT

## 2024-04-05 PROCEDURE — 770020 HCHG ROOM/CARE - TELE (206)

## 2024-04-05 PROCEDURE — 85014 HEMATOCRIT: CPT

## 2024-04-05 PROCEDURE — 97162 PT EVAL MOD COMPLEX 30 MIN: CPT

## 2024-04-05 PROCEDURE — 85018 HEMOGLOBIN: CPT

## 2024-04-05 PROCEDURE — 700102 HCHG RX REV CODE 250 W/ 637 OVERRIDE(OP)

## 2024-04-05 PROCEDURE — 99233 SBSQ HOSP IP/OBS HIGH 50: CPT | Performed by: INTERNAL MEDICINE

## 2024-04-05 PROCEDURE — 36415 COLL VENOUS BLD VENIPUNCTURE: CPT

## 2024-04-05 RX ADMIN — ASPIRIN 81 MG: 81 TABLET, COATED ORAL at 05:16

## 2024-04-05 RX ADMIN — ALPRAZOLAM 0.25 MG: 0.25 TABLET ORAL at 18:13

## 2024-04-05 RX ADMIN — PHENYLEPHRINE HYDROCHLORIDE 1 SPRAY: 1 SPRAY NASAL at 08:47

## 2024-04-05 RX ADMIN — ATORVASTATIN CALCIUM 40 MG: 40 TABLET, FILM COATED ORAL at 18:00

## 2024-04-05 RX ADMIN — AMOXICILLIN AND CLAVULANATE POTASSIUM 1 TABLET: 875; 125 TABLET, FILM COATED ORAL at 18:00

## 2024-04-05 RX ADMIN — SPIRONOLACTONE 25 MG: 25 TABLET ORAL at 05:16

## 2024-04-05 RX ADMIN — DAPAGLIFLOZIN 10 MG: 10 TABLET, FILM COATED ORAL at 05:16

## 2024-04-05 RX ADMIN — FUROSEMIDE 40 MG: 40 TABLET ORAL at 05:16

## 2024-04-05 RX ADMIN — AMOXICILLIN AND CLAVULANATE POTASSIUM 1 TABLET: 875; 125 TABLET, FILM COATED ORAL at 05:15

## 2024-04-05 RX ADMIN — LOSARTAN POTASSIUM 25 MG: 25 TABLET, FILM COATED ORAL at 18:00

## 2024-04-05 RX ADMIN — CARVEDILOL 12.5 MG: 12.5 TABLET, FILM COATED ORAL at 18:00

## 2024-04-05 RX ADMIN — APIXABAN 10 MG: 5 TABLET, FILM COATED ORAL at 05:16

## 2024-04-05 RX ADMIN — SCOPOLAMINE 1 PATCH: 1.5 PATCH, EXTENDED RELEASE TRANSDERMAL at 19:54

## 2024-04-05 RX ADMIN — PHENYLEPHRINE HYDROCHLORIDE 1 SPRAY: 1 SPRAY NASAL at 11:52

## 2024-04-05 RX ADMIN — CARVEDILOL 12.5 MG: 12.5 TABLET, FILM COATED ORAL at 08:48

## 2024-04-05 RX ADMIN — APIXABAN 5 MG: 5 TABLET, FILM COATED ORAL at 18:00

## 2024-04-05 ASSESSMENT — GAIT ASSESSMENTS
ASSISTIVE DEVICE: FRONT WHEEL WALKER
DEVIATION: BRADYKINETIC;DECREASED HEEL STRIKE;DECREASED TOE OFF
DISTANCE (FEET): 200
GAIT LEVEL OF ASSIST: STANDBY ASSIST

## 2024-04-05 ASSESSMENT — ENCOUNTER SYMPTOMS
DIARRHEA: 0
CHILLS: 0
ABDOMINAL PAIN: 0
SHORTNESS OF BREATH: 1
NAUSEA: 0
VOMITING: 0
FEVER: 0
COUGH: 1

## 2024-04-05 ASSESSMENT — COGNITIVE AND FUNCTIONAL STATUS - GENERAL
WALKING IN HOSPITAL ROOM: A LITTLE
SUGGESTED CMS G CODE MODIFIER MOBILITY: CJ
STANDING UP FROM CHAIR USING ARMS: A LITTLE
MOBILITY SCORE: 20
MOVING TO AND FROM BED TO CHAIR: A LITTLE
CLIMB 3 TO 5 STEPS WITH RAILING: A LITTLE

## 2024-04-05 ASSESSMENT — PAIN DESCRIPTION - PAIN TYPE: TYPE: ACUTE PAIN

## 2024-04-05 NOTE — PROGRESS NOTES
Patient is A&Ox4. No complaints of pain. Patient currently having a nosebleed hospitalist aware, medication given with resolution at this time. Plan of care discussed with the patient, all concerns addressed. Call light is within reach and patient educated on fall precautions, verbalized and demonstrated understanding. Bed in lowest and locked position. Hourly rounding in place.

## 2024-04-05 NOTE — THERAPY
"Physical Therapy   Initial Evaluation     Patient Name: Ministerio Tenorio  Age:  68 y.o., Sex:  male  Medical Record #: [id number]  Today's Date: 4/5/2024     Precautions  Precautions: Fall Risk  Comments: supplemental O2    Assessment  Patient is 68 y.o. male admitted with chest pain and SOB, found to have PNA and LV mural thrombus. Pt presenting with decreased activity tolerance and generalize weakness. Pt agreeable to ambulation in hallway with FWW, tolerated 200 ft with slow but steady gait. PT to follow while in house. Pt anticipates returning home to care for spouse. See below for status, goals and POC  Plan    Physical Therapy Initial Treatment Plan   Treatment Plan : (P) Gait Training, Therapeutic Activities, Stair Training  Treatment Frequency: (P) 3 Times per Week  Duration: (P) Until Therapy Goals Met    DC Equipment Recommendations: (P) None  Discharge Recommendations: (P) Anticipate that the patient will have no further physical therapy needs after discharge from the hospital         Initial Contact Note    Initial Contact Note Order Received and Verified, Physical Therapy Evaluation in Progress with Full Report to Follow.   Precautions   Precautions Fall Risk   Comments supplemental O2   Vitals   O2 (LPM) 2   O2 Delivery Device Mask   Prior Living Situation   Prior Services Home-Independent;None   Housing / Facility [address]   Steps Into Home 4   Bathroom Set up Walk In Shower;Shower Chair;Grab Bars   Equipment Owned Front-Wheel Walker;Tub / Shower Seat   Lives with - Patient's Self Care Capacity Spouse   Comments Pt assists spouse \" when she falls down\" due to drinking.   Prior Level of Functional Mobility   Bed Mobility Independent   Transfer Status Independent   Ambulation Independent   Ambulation Distance community   Assistive Devices Used None   Stairs Independent   Comments Pt is responsible to cooking, cleaning, and shopping   Cognition    Level of Consciousness Alert   Ability To Follow " Commands 3 Step   Comments pleasant and cooperative, frustrated with his home situation due to spouse's 30 yrs of drinking.   Active ROM Lower Body    Active ROM Lower Body  WDL   Strength Lower Body   Lower Body Strength  X   Gross Strength Generalized Weakness, Equal Bilaterally   Sensation Lower Body   Lower Extremity Sensation   WDL   Neurological Concerns   Neurological Concerns No   Balance Assessment   Sitting Balance (Static) Good   Sitting Balance (Dynamic) Good   Standing Balance (Static) Fair +   Standing Balance (Dynamic) Fair   Weight Shift Sitting Good   Weight Shift Standing Fair   Comments w/FWW   Bed Mobility    Supine to Sit Modified Independent   Sit to Supine Modified Independent   Scooting Modified Independent   Rolling Modified Independent   Gait Analysis   Gait Level Of Assist Standby Assist   Assistive Device Front Wheel Walker   Distance (Feet) 200   # of Times Distance was Traveled 1   Deviation Bradykinetic;Decreased Heel Strike;Decreased Toe Off   # of Stairs Climbed 0   Weight Bearing Status no restrictions   Functional Mobility   Sit to Stand Standby Assist   Bed, Chair, Wheelchair Transfer Standby Assist   Toilet Transfers Standby Assist   6 Clicks Assessment - How much HELP from another person do you currently need... (If the patient hasn't done an activity recently, how much help from another person do you think he/she would need if he/she tried?)   Turning from your back to your side while in a flat bed without using bedrails? 4   Moving from lying on your back to sitting on the side of a flat bed without using bedrails? 4   Moving to and from a bed to a chair (including a wheelchair)? 3   Standing up from a chair using your arms (e.g., wheelchair, or bedside chair)? 3   Walking in hospital room? 3   Climbing 3-5 steps with a railing? 3   6 clicks Mobility Score 20   Patient / Family Goals    Patient / Family Goal #1 return home   Short Term Goals    Short Term Goal # 1 Pt will  ambulate with LRAD for 250 ft at S level for direction on unit by tx 6   Short Term Goal # 2 Pt will ascend and descend steps x 2 with rail at S level by tx 6   Education Group   Education Provided Role of Physical Therapist;Gait Training;Use of Assistive Device   Role of Physical Therapist Patient Response Patient;Acceptance;Explanation;Verbal Demonstration   Gait Training Patient Response Patient;Acceptance;Explanation;Action Demonstration   Use of Assistive Device Patient Response Patient;Acceptance;Explanation;Action Demonstration   Physical Therapy Initial Treatment Plan    Treatment Plan  Gait Training;Therapeutic Activities;Stair Training   Treatment Frequency 3 Times per Week   Duration Until Therapy Goals Met   Problem List    Problems Functional Strength Deficit;Decreased Activity Tolerance;Impaired Ambulation   Anticipated Discharge Equipment and Recommendations   DC Equipment Recommendations None   Discharge Recommendations Anticipate that the patient will have no further physical therapy needs after discharge from the hospital   Interdisciplinary Plan of Care Collaboration   IDT Collaboration with  Nursing   Patient Position at End of Therapy In Bed;Phone within Reach;Tray Table within Reach;Call Light within Reach;Bed Alarm On   Collaboration Comments staff updated.   Session Information   Date / Session Number  4/5-1 ( 1/3,4/11)

## 2024-04-05 NOTE — PROGRESS NOTES
Handoff report received from day shift nurse. Pt care assumed. Pt is currently resting in bed awake and NAD noted. POC discussed with Pt and Pt verbalizes no questions or needs at this time. Pt is AAOx4, on 2L NC, on Tele monitoring with rate and rhythm verified, and VSS. Call light and belongings within reach, bed in lowest and locked position, fall precautions in place, and Pt educated on use of call light. Hourly rounding in place.

## 2024-04-05 NOTE — CARE PLAN
The patient is Stable - Low risk of patient condition declining or worsening    Shift Goals  Clinical Goals: monitor O2 saturations, abx, wean O2 for home  Patient Goals: rest, safety  Family Goals: daisy    Progress made toward(s) clinical / shift goals:    Problem: Pain - Standard  Goal: Alleviation of pain or a reduction in pain to the patient’s comfort goal  Outcome: Progressing     Problem: Knowledge Deficit - Standard  Goal: Patient and family/care givers will demonstrate understanding of plan of care, disease process/condition, diagnostic tests and medications  Outcome: Progressing     Problem: Respiratory  Goal: Patient will achieve/maintain optimum respiratory ventilation and gas exchange  Outcome: Progressing     Problem: Risk for Aspiration  Goal: Patient's risk for aspiration will be absent or decrease  Outcome: Progressing     Problem: Hemodynamics - Pneumonia  Goal: Patient's hemodynamics, fluid balance and neurologic status will be stable or improve  Outcome: Progressing     Problem: Self Care  Goal: Patient will have the ability to perform ADLs independently or with assistance (bathe, groom, dress, toilet and feed)  Outcome: Progressing     Problem: Fall Risk  Goal: Patient will remain free from falls  Outcome: Progressing       Patient remained free from falls, denies pain, and is receiving O2 via NC at 1L with the goal to wean.

## 2024-04-05 NOTE — CARE PLAN
The patient is Stable - Low risk of patient condition declining or worsening    Shift Goals  Clinical Goals: monitor O2 saturation, discharge planning  Patient Goals: rest, POC updates on discharge  Family Goals: NA    Progress made toward(s) clinical / shift goals:    Problem: Pain - Standard  Goal: Alleviation of pain or a reduction in pain to the patient’s comfort goal  Outcome: Progressing     Problem: Knowledge Deficit - Standard  Goal: Patient and family/care givers will demonstrate understanding of plan of care, disease process/condition, diagnostic tests and medications  Outcome: Progressing     Problem: Respiratory  Goal: Patient will achieve/maintain optimum respiratory ventilation and gas exchange  Outcome: Progressing     Problem: Fall Risk  Goal: Patient will remain free from falls  Outcome: Progressing       Patient is not progressing towards the following goals:

## 2024-04-05 NOTE — PROGRESS NOTES
Hospital Medicine Daily Progress Note    Date of Service  4/5/2024    Chief Complaint  Ministerio Tenorio is a 68 y.o. male admitted 4/2/2024 with chest pain and SOB      Hospital Course  This is a 68 y.o. male who was transferred from outside facility 4/2/2024 with shortness of breath and chest pain.  PMH of CAD s/p 4 stents in 2022, s/p AICD, hypertension, dyslipidemia, PE.  Patient said about 7 or 8 months ago he stopped taking all his medications because he could not reach his doctors in [place].  For the past 4 days he has had shortness of breath, right-sided sharp chest pain that came on at rest, productive cough.  Denies fever/chills, no hemoptysis.   He was found to have an elevated BNP concern for HF, echo showed EF of 15% and cardiology was consulted.  Patient admitted for further treatment     Interval Problem Update  Patient seen and examined, afebrile, resting in bed, denies chest pain, seen by cardiology and case discussed, now GDMT also on lasix,   Regarding his LV thrombus, started on initially on heparin héctor switch to eliquis  Wean off O2 as tolerated   4/4: Patient resting in bed, afebrile, still with SOB, on 4L of oxygen   Cont on Lasix and also on eliquis for his LV thrombus  Wean off O2 as tolerated   4/5: Patient seen and examined, started having nose bleed this AM, held eliquis initially and started phenylephrine nasal spray   His bleeding has now stopped,  Will restart eliquis on 5 mg this afternoon instead of 10   Close monitor for bleeding   I have discussed this patient's plan of care and discharge plan at IDT rounds today with Case Management, Nursing, Nursing leadership, and other members of the IDT team.    Consultants/Specialty  cardiology    Code Status  Full Code    Disposition  The patient is not medically cleared for discharge to home or a post-acute facility.      I have placed the appropriate orders for post-discharge needs.    Review of Systems  Review of Systems    Constitutional:  Negative for chills and fever.   Respiratory:  Positive for cough and shortness of breath.    Cardiovascular:  Negative for chest pain.   Gastrointestinal:  Negative for abdominal pain, diarrhea, nausea and vomiting.   Genitourinary:  Negative for dysuria and urgency.        Physical Exam  Temp:  [36.3 °C (97.3 °F)-37.1 °C (98.7 °F)] 36.3 °C (97.3 °F)  Pulse:  [60-94] 60  Resp:  [18-20] 20  BP: ()/(59-86) 93/59  SpO2:  [92 %-98 %] 98 %    Physical Exam  Constitutional:       Appearance: Normal appearance.   HENT:      Head: Normocephalic and atraumatic.      Mouth/Throat:      Mouth: Mucous membranes are moist.      Pharynx: Oropharynx is clear. No oropharyngeal exudate or posterior oropharyngeal erythema.   Eyes:      General: No scleral icterus.  Cardiovascular:      Rate and Rhythm: Normal rate and regular rhythm.      Pulses: Normal pulses.      Heart sounds: Normal heart sounds. No murmur heard.  Pulmonary:      Effort: Pulmonary effort is normal. No respiratory distress.      Breath sounds: Normal breath sounds. No wheezing.   Abdominal:      Palpations: Abdomen is soft.      Tenderness: There is no abdominal tenderness.   Musculoskeletal:         General: No swelling or tenderness. Normal range of motion.      Cervical back: Normal range of motion.   Skin:     General: Skin is warm and dry.   Neurological:      General: No focal deficit present.      Mental Status: He is alert and oriented to person, place, and time. Mental status is at baseline.   Psychiatric:         Mood and Affect: Mood normal.         Fluids    Intake/Output Summary (Last 24 hours) at 4/5/2024 1301  Last data filed at 4/5/2024 1228  Gross per 24 hour   Intake 1210 ml   Output 1000 ml   Net 210 ml       Laboratory  Recent Labs     04/03/24  0135 04/04/24  0012 04/05/24  0746   WBC 9.2 7.7 9.6   RBC 4.38* 4.48* 4.78   HEMOGLOBIN 13.3* 13.7* 14.9   HEMATOCRIT 40.3* 41.5* 43.7   MCV 92.0 92.6 91.4   MCH 30.4 30.6  31.2   MCHC 33.0 33.0 34.1   RDW 42.8 43.8 42.2   PLATELETCT 210 216 253   MPV 11.2 11.6 11.2     Recent Labs     04/03/24  0135 04/04/24  0012 04/05/24  0746   SODIUM 138 137 137   POTASSIUM 3.3* 4.0 4.0   CHLORIDE 103 103 103   CO2 23 22 22   GLUCOSE 117* 106* 102*   BUN 32* 30* 24*   CREATININE 1.13 1.17 1.15   CALCIUM 8.1* 8.5 8.7     Recent Labs     04/02/24  1439   APTT 30.0   INR 1.51*                 Imaging  EC-ECHOCARDIOGRAM COMPLETE W/O CONT   Final Result      DX-CHEST-PORTABLE (1 VIEW)   Final Result         1.  Scattered hazy bilateral pulmonary infiltrates, greatest in the right upper lobe.   2.  Trace bilateral pleural effusions   3.  Cardiomegaly   4.  Atherosclerosis           Assessment/Plan  * LV (left ventricular) mural thrombus  Assessment & Plan  Was on heparin drip   Will switch to eliquis with loading dose  Close monitoring on tele   Cardiology following case discussed appreciate rec.       Systolic HF (heart failure) (HCC)  Assessment & Plan  Cardiology following and case discussed , now on GDMT  Wean off O2   Appreciate rec.     History of pulmonary embolus (PE)- (present on admission)  Assessment & Plan  Patient denies any history of any blood clot in his lungs and is on apixaban  He stopped taking all his meds.  CTA at outside facility negative for PE  Outpatient follow-up    Pneumonia of both upper lobes due to infectious organism- (present on admission)  Assessment & Plan  Patient feeling shortness of breath, has had a productive cough for the past 4 days.  Denies fever/chills  Respiratory panel, procalcitonin ordered  He was started on antibiotics at outside facility, will continue for now    Dyslipidemia- (present on admission)  Assessment & Plan  He stopped taking his meds.  Restart atorvastatin    Coronary artery disease involving native coronary artery of native heart without angina pectoris- (present on admission)  Assessment & Plan  Said he had 4 stents placed 2022.  Has not  been on any of his medications  Restart aspirin    Essential hypertension- (present on admission)  Assessment & Plan  Stop taking all his medications few months ago because he could not follow his physician  Restart metoprolol.  As needed antihypertensives ordered  Monitor and adjust as needed     Acute respiratory failure with hypoxia (HCC)- (present on admission)  Assessment & Plan  Requiring fluid on my evaluation, desaturating on oxygen was lowered  Possible related to systolic HF now on lasix also concern for pneumonia cont on abx switching to Augmentin  Wean off O 2 as tolerated          VTE prophylaxis: Eliquis     Greater than 51 minutes spent prepping to see patient (e.g. review of tests) obtaining and/or reviewing separately obtained history. Performing a medically appropriate examination and/ evaluation.  Counseling and educating the patient/family/caregiver.  Ordering medications, tests, or procedures.  Referring and communicating with other health care professionals.  Documenting clinical information in EPIC.  Independently interpreting results and communicating results to patient/family/caregiver.  Care coordination.      I have performed a physical exam and reviewed and updated ROS and Plan today (4/5/2024). In review of yesterday's note (4/4/2024), there are no changes except as documented above.

## 2024-04-05 NOTE — THERAPY
"Occupational Therapy   Initial Evaluation     Patient Name: Ministerio Tenorio  Age:  68 y.o., Sex:  male  Medical Record #: [id number]  Today's Date: 4/4/2024     Precautions  Precautions: Fall Risk    Assessment    Patient is 68 y.o. male admitted with chest pain and SOB, found to have PNA and LV mural thrombus, now on Eliquis. Other pertinent medical history includes CAD s/p 4 stents (2022), s/p AICD, HTN, DLD, and PE. Pt seen for OT evaluation. Pt declined ADL participation, however demonstrated ability to reach face/feet while in bed or EOB. Pt required supv for bed mobility and CGA to stand EOB. Pt declined further functional mobility this date. Pt lives with his wife who he cares for. Pt reported he does \"everything\" for her and when asked about if he provides physical assist for her, he stated \"I do whatever I need to.\" Pt educated regarding the role of OT and the pathology of bedrest. Pt current functional performance limited by impaired cognition, impaired balance, generalized weakness, and impaired activity tolerance. Pt will benefit from skilled OT while admitted to acute care.     Plan    Occupational Therapy Initial Treatment Plan   Treatment Interventions: Self Care / Activities of Daily Living, Adaptive Equipment, Neuro Re-Education / Balance, Therapeutic Exercises, Therapeutic Activity, Cognitive Skill Development  Treatment Frequency: 3 Times per Week  Duration: Until Therapy Goals Met    DC Equipment Recommendations: Unable to determine at this time  Discharge Recommendations: Recommend post-acute placement for additional occupational therapy services prior to discharge home (if pt declines, recommend )      Objective     04/04/24 1538   Prior Living Situation   Prior Services Home-Independent;None   Housing / Facility 1 Story House   Steps Into Home 4   Bathroom Set up Walk In Shower;Shower Chair;Grab Bars   Equipment Owned Tub / Shower Seat;Grab Bar(s) In Tub / Shower   Lives with - Patient's Self " "Care Capacity Spouse   Comments Pt is the primary caregiver for his spouse. Pt reported he helps her with \"everything\" and when asked if he provides physical assist for her, he stated \"I do whatever I need to do\". Pt was a questionable historian, providing mixed responses to questions at times.   Prior Level of ADL Function   Self Feeding Independent   Grooming / Hygiene Independent   Bathing Independent   Dressing Independent   Toileting Independent   Prior Level of IADL Function   Medication Management Independent   Laundry Independent   Kitchen Mobility Independent   Finances Independent   Home Management Independent   Shopping Independent   Prior Level Of Mobility Independent Without Device in Community;Independent Without Device in Home   Driving / Transportation   (initially stated the he does not drive and then later stated that he does)   Precautions   Precautions Fall Risk   Vitals   Pulse 71   Pulse Oximetry 92 %   O2 (LPM) 2   O2 Delivery Device Nasal Cannula   Pain   Pain Scales 0 to 10 Scale    Pain 0 - 10 Group   Therapist Pain Assessment Post Activity Pain Same as Prior to Activity;Nurse Notified  (not rated, agreeable to eval)   Cognition    Cognition / Consciousness X   Level of Consciousness Alert   Ability To Follow Commands 1 Step   Safety Awareness Impaired;Impulsive   Comments Pt cooperative given education, impulsive, frustrated initially by therapist presence but able to be redirected   Passive ROM Upper Body   Passive ROM Upper Body WDL   Active ROM Upper Body   Active ROM Upper Body  WDL   Strength Upper Body   Upper Body Strength  WDL   Sensation Upper Body   Upper Extremity Sensation  WDL   Upper Body Muscle Tone   Upper Body Muscle Tone  WDL   Coordination Upper Body   Comments WFL from observation   Balance Assessment   Sitting Balance (Static) Fair   Sitting Balance (Dynamic) Fair   Standing Balance (Static) Fair -   Standing Balance (Dynamic) Poor +   Weight Shift Sitting Fair "   Weight Shift Standing Poor   Comments w/ no AD   Bed Mobility    Supine to Sit Supervised   Sit to Supine Supervised   Scooting Supervised   Rolling Supervised   Comments HOB slightly elevated   ADL Assessment   Comments demonstrated ability to reach face with B UE and feet with B UE; declined ADL participation at time of eval; per RN, pt has demonstrated difficulty completing toilet transfers and toilet hygiene   Functional Mobility   Sit to Stand Contact Guard Assist   Mobility EOB>stand x1 only>supine   Comments w/ no AD; per RN, pt has had multiple LOB while walking to the bathroom, further assessment required   Visual Perception   Visual Perception  Not Tested   Activity Tolerance   Sitting in Chair NT   Sitting Edge of Bed <1 min   Standing <1 min   Comments limited by increased work of breathing, cognition   Patient / Family Goals   Patient / Family Goal #1 to go home to his wife   Short Term Goals   Short Term Goal # 1 Pt will perform ADL transfer w/ supv   Short Term Goal # 2 Pt will perform LB dressing w/ supv   Short Term Goal # 3 Pt will perform toilet hygiene w/ supv   Education Group   Education Provided Role of Occupational Therapist;Activities of Daily Living   Role of Occupational Therapist Patient Response Patient;Acceptance;Explanation;Verbal Demonstration   ADL Patient Response Patient;Acceptance;Explanation;Demonstration;Verbal Demonstration;Action Demonstration   Occupational Therapy Initial Treatment Plan    Treatment Interventions Self Care / Activities of Daily Living;Adaptive Equipment;Neuro Re-Education / Balance;Therapeutic Exercises;Therapeutic Activity;Cognitive Skill Development   Treatment Frequency 3 Times per Week   Duration Until Therapy Goals Met   Problem List   Problem List Decreased Homemaking Skills;Decreased Active Daily Living Skills;Decreased Activity Tolerance;Safety Awareness Deficits / Cognition;Impaired Cognitive Function;Impaired Postural Control / Balance

## 2024-04-06 ENCOUNTER — APPOINTMENT (OUTPATIENT)
Dept: RADIOLOGY | Facility: MEDICAL CENTER | Age: 69
DRG: 193 | End: 2024-04-06
Payer: MEDICARE

## 2024-04-06 LAB
ANION GAP SERPL CALC-SCNC: 13 MMOL/L (ref 7–16)
BUN SERPL-MCNC: 25 MG/DL (ref 8–22)
CALCIUM SERPL-MCNC: 8.8 MG/DL (ref 8.5–10.5)
CHLORIDE SERPL-SCNC: 101 MMOL/L (ref 96–112)
CO2 SERPL-SCNC: 24 MMOL/L (ref 20–33)
CREAT SERPL-MCNC: 1.13 MG/DL (ref 0.5–1.4)
ERYTHROCYTE [DISTWIDTH] IN BLOOD BY AUTOMATED COUNT: 43.5 FL (ref 35.9–50)
GFR SERPLBLD CREATININE-BSD FMLA CKD-EPI: 71 ML/MIN/1.73 M 2
GLUCOSE SERPL-MCNC: 104 MG/DL (ref 65–99)
HCT VFR BLD AUTO: 43.6 % (ref 42–52)
HGB BLD-MCNC: 14.5 G/DL (ref 14–18)
MCH RBC QN AUTO: 30.7 PG (ref 27–33)
MCHC RBC AUTO-ENTMCNC: 33.3 G/DL (ref 32.3–36.5)
MCV RBC AUTO: 92.2 FL (ref 81.4–97.8)
PLATELET # BLD AUTO: 244 K/UL (ref 164–446)
PMV BLD AUTO: 11.4 FL (ref 9–12.9)
POTASSIUM SERPL-SCNC: 4.2 MMOL/L (ref 3.6–5.5)
RBC # BLD AUTO: 4.73 M/UL (ref 4.7–6.1)
SODIUM SERPL-SCNC: 138 MMOL/L (ref 135–145)
WBC # BLD AUTO: 8.2 K/UL (ref 4.8–10.8)

## 2024-04-06 PROCEDURE — 700102 HCHG RX REV CODE 250 W/ 637 OVERRIDE(OP)

## 2024-04-06 PROCEDURE — 770020 HCHG ROOM/CARE - TELE (206)

## 2024-04-06 PROCEDURE — 700102 HCHG RX REV CODE 250 W/ 637 OVERRIDE(OP): Performed by: INTERNAL MEDICINE

## 2024-04-06 PROCEDURE — 80048 BASIC METABOLIC PNL TOTAL CA: CPT

## 2024-04-06 PROCEDURE — A9270 NON-COVERED ITEM OR SERVICE: HCPCS | Performed by: STUDENT IN AN ORGANIZED HEALTH CARE EDUCATION/TRAINING PROGRAM

## 2024-04-06 PROCEDURE — A9270 NON-COVERED ITEM OR SERVICE: HCPCS | Performed by: INTERNAL MEDICINE

## 2024-04-06 PROCEDURE — 70450 CT HEAD/BRAIN W/O DYE: CPT

## 2024-04-06 PROCEDURE — A9270 NON-COVERED ITEM OR SERVICE: HCPCS

## 2024-04-06 PROCEDURE — 85027 COMPLETE CBC AUTOMATED: CPT

## 2024-04-06 PROCEDURE — 700102 HCHG RX REV CODE 250 W/ 637 OVERRIDE(OP): Performed by: STUDENT IN AN ORGANIZED HEALTH CARE EDUCATION/TRAINING PROGRAM

## 2024-04-06 PROCEDURE — 99233 SBSQ HOSP IP/OBS HIGH 50: CPT | Performed by: INTERNAL MEDICINE

## 2024-04-06 PROCEDURE — 36415 COLL VENOUS BLD VENIPUNCTURE: CPT

## 2024-04-06 RX ORDER — FUROSEMIDE 20 MG/1
20 TABLET ORAL
Status: DISCONTINUED | OUTPATIENT
Start: 2024-04-07 | End: 2024-04-09 | Stop reason: HOSPADM

## 2024-04-06 RX ADMIN — APIXABAN 5 MG: 5 TABLET, FILM COATED ORAL at 17:18

## 2024-04-06 RX ADMIN — DAPAGLIFLOZIN 10 MG: 10 TABLET, FILM COATED ORAL at 05:44

## 2024-04-06 RX ADMIN — CARVEDILOL 12.5 MG: 12.5 TABLET, FILM COATED ORAL at 07:42

## 2024-04-06 RX ADMIN — ATORVASTATIN CALCIUM 40 MG: 40 TABLET, FILM COATED ORAL at 17:18

## 2024-04-06 RX ADMIN — AMOXICILLIN AND CLAVULANATE POTASSIUM 1 TABLET: 875; 125 TABLET, FILM COATED ORAL at 05:44

## 2024-04-06 RX ADMIN — CARVEDILOL 12.5 MG: 12.5 TABLET, FILM COATED ORAL at 17:18

## 2024-04-06 RX ADMIN — SPIRONOLACTONE 25 MG: 25 TABLET ORAL at 05:44

## 2024-04-06 RX ADMIN — ASPIRIN 81 MG: 81 TABLET, COATED ORAL at 05:44

## 2024-04-06 RX ADMIN — APIXABAN 5 MG: 5 TABLET, FILM COATED ORAL at 05:45

## 2024-04-06 RX ADMIN — LOSARTAN POTASSIUM 25 MG: 25 TABLET, FILM COATED ORAL at 17:18

## 2024-04-06 RX ADMIN — AMOXICILLIN AND CLAVULANATE POTASSIUM 1 TABLET: 875; 125 TABLET, FILM COATED ORAL at 17:18

## 2024-04-06 RX ADMIN — FUROSEMIDE 40 MG: 40 TABLET ORAL at 05:45

## 2024-04-06 ASSESSMENT — PATIENT HEALTH QUESTIONNAIRE - PHQ9
SUM OF ALL RESPONSES TO PHQ9 QUESTIONS 1 AND 2: 0
1. LITTLE INTEREST OR PLEASURE IN DOING THINGS: NOT AT ALL
2. FEELING DOWN, DEPRESSED, IRRITABLE, OR HOPELESS: NOT AT ALL

## 2024-04-06 ASSESSMENT — ENCOUNTER SYMPTOMS
SHORTNESS OF BREATH: 1
FEVER: 0
ABDOMINAL PAIN: 0
CHILLS: 0
COUGH: 1
NAUSEA: 0
VOMITING: 0
DIARRHEA: 0

## 2024-04-06 ASSESSMENT — PAIN DESCRIPTION - PAIN TYPE: TYPE: ACUTE PAIN

## 2024-04-06 NOTE — PROGRESS NOTES
[facility] Medicine Daily Progress Note    Date of Service  4/6/2024    Chief Complaint  Ministerio Tenorio is a 68 y.o. male admitted 4/2/2024 with chest pain and SOB      Hospital Course  This is a 68 y.o. male who was transferred from outside facility 4/2/2024 with shortness of breath and chest pain.  PMH of CAD s/p 4 stents in 2022, s/p AICD, hypertension, dyslipidemia, PE.  Patient said about 7 or 8 months ago he stopped taking all his medications because he could not reach his doctors in [place].  For the past 4 days he has had shortness of breath, right-sided sharp chest pain that came on at rest, productive cough.  Denies fever/chills, no hemoptysis.   He was found to have an elevated BNP concern for HF, echo showed EF of 15% and cardiology was consulted.  Patient admitted for further treatment     Interval Problem Update  Patient seen and examined, afebrile, resting in bed, denies chest pain, seen by cardiology and case discussed, now GDMT also on lasix,   Regarding his LV thrombus, started on initially on heparin héctor switch to eliquis  Wean off O2 as tolerated   4/4: Patient resting in bed, afebrile, still with SOB, on 4L of oxygen   Cont on Lasix and also on eliquis for his LV thrombus  Wean off O2 as tolerated   4/5: Patient seen and examined, started having nose bleed this AM, held eliquis initially and started phenylephrine nasal spray   His bleeding has now stopped,  Will restart eliquis on 5 mg this afternoon instead of 10   Close monitor for bleeding   4/6: Patient seen and examined, afebrile, no nausea or vomiting feels weak, his BP on the low side will decrease lasix dose to 20 mg.  Cont on eliquis no more nose bleed   Close monitoring on tele   I have discussed this patient's plan of care and discharge plan at IDT rounds today with Case Management, Nursing, Nursing leadership, and other members of the IDT team.    Consultants/Specialty  cardiology    Code Status  Full Code    Disposition  The  patient is not medically cleared for discharge to home or a post-acute facility.      I have placed the appropriate orders for post-discharge needs.    Review of Systems  Review of Systems   Constitutional:  Negative for chills and fever.   Respiratory:  Positive for cough and shortness of breath.    Cardiovascular:  Negative for chest pain.   Gastrointestinal:  Negative for abdominal pain, diarrhea, nausea and vomiting.   Genitourinary:  Negative for dysuria and urgency.        Physical Exam  Temp:  [36.2 °C (97.2 °F)-36.5 °C (97.7 °F)] 36.5 °C (97.7 °F)  Pulse:  [68-81] 68  Resp:  [18-19] 19  BP: ()/(50-71) 81/52  SpO2:  [91 %-98 %] 96 %    Physical Exam  Constitutional:       Appearance: Normal appearance.   HENT:      Head: Normocephalic and atraumatic.      Mouth/Throat:      Mouth: Mucous membranes are moist.      Pharynx: Oropharynx is clear. No oropharyngeal exudate or posterior oropharyngeal erythema.   Eyes:      General: No scleral icterus.  Cardiovascular:      Rate and Rhythm: Normal rate and regular rhythm.      Pulses: Normal pulses.      Heart sounds: Normal heart sounds. No murmur heard.  Pulmonary:      Effort: Pulmonary effort is normal. No respiratory distress.      Breath sounds: Normal breath sounds. No wheezing.   Abdominal:      Palpations: Abdomen is soft.      Tenderness: There is no abdominal tenderness.   Musculoskeletal:         General: No swelling or tenderness. Normal range of motion.      Cervical back: Normal range of motion.   Skin:     General: Skin is warm and dry.   Neurological:      General: No focal deficit present.      Mental Status: He is alert and oriented to person, place, and time. Mental status is at baseline.   Psychiatric:         Mood and Affect: Mood normal.         Fluids    Intake/Output Summary (Last 24 hours) at 4/6/2024 1350  Last data filed at 4/6/2024 1312  Gross per 24 hour   Intake 1520 ml   Output 650 ml   Net 870 ml       Laboratory  Recent Labs      04/04/24  0012 04/05/24  0746 04/05/24  1325 04/06/24  0119   WBC 7.7 9.6  --  8.2   RBC 4.48* 4.78  --  4.73   HEMOGLOBIN 13.7* 14.9 13.9* 14.5   HEMATOCRIT 41.5* 43.7 41.0* 43.6   MCV 92.6 91.4  --  92.2   MCH 30.6 31.2  --  30.7   MCHC 33.0 34.1  --  33.3   RDW 43.8 42.2  --  43.5   PLATELETCT 216 253  --  244   MPV 11.6 11.2  --  11.4     Recent Labs     04/04/24  0012 04/05/24  0746 04/06/24  0119   SODIUM 137 137 138   POTASSIUM 4.0 4.0 4.2   CHLORIDE 103 103 101   CO2 22 22 24   GLUCOSE 106* 102* 104*   BUN 30* 24* 25*   CREATININE 1.17 1.15 1.13   CALCIUM 8.5 8.7 8.8                       Imaging  EC-ECHOCARDIOGRAM COMPLETE W/O CONT   Final Result      DX-CHEST-PORTABLE (1 VIEW)   Final Result         1.  Scattered hazy bilateral pulmonary infiltrates, greatest in the right upper lobe.   2.  Trace bilateral pleural effusions   3.  Cardiomegaly   4.  Atherosclerosis           Assessment/Plan  * LV (left ventricular) mural thrombus  Assessment & Plan  Was on heparin drip   Will switch to eliquis with loading dose  Close monitoring on tele   Cardiology following case discussed appreciate rec.       Systolic HF (heart failure) (HCC)  Assessment & Plan  Cardiology following and case discussed , now on GDMT  Wean off O2   Appreciate rec.     History of pulmonary embolus (PE)- (present on admission)  Assessment & Plan  Patient denies any history of any blood clot in his lungs and is on apixaban  He stopped taking all his meds.  CTA at outside facility negative for PE  Outpatient follow-up    Pneumonia of both upper lobes due to infectious organism- (present on admission)  Assessment & Plan  Patient feeling shortness of breath, has had a productive cough for the past 4 days.  Denies fever/chills  Respiratory panel, procalcitonin ordered  He was started on antibiotics at outside facility, will continue for now    Dyslipidemia- (present on admission)  Assessment & Plan  He stopped taking his meds.  Restart  atorvastatin    Coronary artery disease involving native coronary artery of native heart without angina pectoris- (present on admission)  Assessment & Plan  Said he had 4 stents placed 2022.  Has not been on any of his medications  Restart aspirin    Essential hypertension- (present on admission)  Assessment & Plan  Stop taking all his medications few months ago because he could not follow his physician  Restart metoprolol.  As needed antihypertensives ordered  Monitor and adjust as needed     Acute respiratory failure with hypoxia (HCC)- (present on admission)  Assessment & Plan  Requiring fluid on my evaluation, desaturating on oxygen was lowered  Possible related to systolic HF now on lasix also concern for pneumonia cont on abx switching to Augmentin  Wean off O 2 as tolerated        Greater than 51 minutes spent prepping to see patient (e.g. review of tests) obtaining and/or reviewing separately obtained history. Performing a medically appropriate examination and/ evaluation.  Counseling and educating the patient/family/caregiver.  Ordering medications, tests, or procedures.  Referring and communicating with other health care professionals.  Documenting clinical information in EPIC.  Independently interpreting results and communicating results to patient/family/caregiver.  Care coordination.      VTE prophylaxis: Eliquis     I have performed a physical exam and reviewed and updated ROS and Plan today (4/6/2024). In review of yesterday's note (4/5/2024), there are no changes except as documented above.

## 2024-04-06 NOTE — PROGRESS NOTES
Patient is refusing bed alarm. Education provided about fall risk and emphasized calling before getting up.

## 2024-04-06 NOTE — CARE PLAN
The patient is Stable - Low risk of patient condition declining or worsening    Shift Goals  Clinical Goals: Wean O2, home O2 eval  Patient Goals: sleep, D/C  Family Goals: TOSIN    Progress made toward(s) clinical / shift goals:      Problem: Knowledge Deficit - Standard  Goal: Patient and family/care givers will demonstrate understanding of plan of care, disease process/condition, diagnostic tests and medications  Outcome: Progressing     Problem: Respiratory  Goal: Patient will achieve/maintain optimum respiratory ventilation and gas exchange  Outcome: Progressing     Problem: Self Care  Goal: Patient will have the ability to perform ADLs independently or with assistance (bathe, groom, dress, toilet and feed)  Outcome: Progressing       Patient is not progressing towards the following goals:

## 2024-04-06 NOTE — FACE TO FACE
"Face to Face Note  -  Durable Medical Equipment    Avani Mcbride M.D. - NPI: [id number]  I certify that this patient is under my care and that they had a durable medical equipment(DME)face to face encounter by myself that meets the physician DME face-to-face encounter requirements with this patient on:    Date of encounter:   Patient:                    MRN:                       YOB: 2024  Ministerio Tenorio  [id number]  1955     The encounter with the patient was in whole, or in part, for the following medical condition, which is the primary reason for durable medical equipment:  CHF    I certify that, based on my findings, the following durable medical equipment is medically necessary:    Oxygen   HOME O2 Saturation Measurements:(Values must be present for Home Oxygen orders)  Room air sat at rest: 90  Room air sat with amb: 83  With liters of O2: 2, O2 sat at rest with O2: 95  With Liters of O2: 4, O2 sat with amb with O2 : 96  Is the patient mobile?: Yes  If patient feels more short of breath, they can go up to 6 liters per minute and contact healthcare provider.    Supporting Symptoms: The patient requires supplemental oxygen, as the following interventions have been tried with limited or no improvement: \"Ambulation with oximetry and \"Incentive spirometry.    My Clinical findings support the need for the above equipment due to:  Hypoxia  "

## 2024-04-06 NOTE — PROGRESS NOTES
Monitor Summary:     Rhythm: SR  Rate: 71-84  Ectopy: rPVC  Measurements: 0.17/0.10/0.47           12 Hour Chart Check     Tele strip pending

## 2024-04-06 NOTE — CARE PLAN
The patient is Stable - Low risk of patient condition declining or worsening    Shift Goals  Clinical Goals: Wean O2, diurese, I&O  Patient Goals: feel better, discharge  Family Goals: Updates    Progress made toward(s) clinical / shift goals:    Problem: Pain - Standard  Goal: Alleviation of pain or a reduction in pain to the patient’s comfort goal  Outcome: Progressing     Problem: Knowledge Deficit - Standard  Goal: Patient and family/care givers will demonstrate understanding of plan of care, disease process/condition, diagnostic tests and medications  Outcome: Progressing     Problem: Respiratory  Goal: Patient will achieve/maintain optimum respiratory ventilation and gas exchange  Outcome: Progressing     Problem: Risk for Aspiration  Goal: Patient's risk for aspiration will be absent or decrease  Outcome: Progressing     Problem: Fall Risk  Goal: Patient will remain free from falls  Outcome: Progressing       O2 weaning, saturations above 95% with decreased amount of supplemental oxygen in use. POC discussed with patient and denies any questions, agreeable to POC. No s/s of aspiration, lung sounds and fluid status improving with medication regimen. Pt calls appropriately to use restroom, free from falls.

## 2024-04-07 ENCOUNTER — APPOINTMENT (OUTPATIENT)
Dept: RADIOLOGY | Facility: MEDICAL CENTER | Age: 69
DRG: 193 | End: 2024-04-07
Payer: MEDICARE

## 2024-04-07 PROBLEM — I63.9 STROKE (HCC): Status: ACTIVE | Noted: 2024-04-07

## 2024-04-07 LAB
ANION GAP SERPL CALC-SCNC: 12 MMOL/L (ref 7–16)
BUN SERPL-MCNC: 21 MG/DL (ref 8–22)
CALCIUM SERPL-MCNC: 8.4 MG/DL (ref 8.5–10.5)
CHLORIDE SERPL-SCNC: 100 MMOL/L (ref 96–112)
CO2 SERPL-SCNC: 23 MMOL/L (ref 20–33)
CREAT SERPL-MCNC: 1.04 MG/DL (ref 0.5–1.4)
ERYTHROCYTE [DISTWIDTH] IN BLOOD BY AUTOMATED COUNT: 42.2 FL (ref 35.9–50)
GFR SERPLBLD CREATININE-BSD FMLA CKD-EPI: 78 ML/MIN/1.73 M 2
GLUCOSE SERPL-MCNC: 150 MG/DL (ref 65–99)
HCT VFR BLD AUTO: 41 % (ref 42–52)
HGB BLD-MCNC: 14.1 G/DL (ref 14–18)
MCH RBC QN AUTO: 31 PG (ref 27–33)
MCHC RBC AUTO-ENTMCNC: 34.4 G/DL (ref 32.3–36.5)
MCV RBC AUTO: 90.1 FL (ref 81.4–97.8)
PLATELET # BLD AUTO: 223 K/UL (ref 164–446)
PMV BLD AUTO: 11.5 FL (ref 9–12.9)
POTASSIUM SERPL-SCNC: 3.9 MMOL/L (ref 3.6–5.5)
RBC # BLD AUTO: 4.55 M/UL (ref 4.7–6.1)
SODIUM SERPL-SCNC: 135 MMOL/L (ref 135–145)
WBC # BLD AUTO: 7.1 K/UL (ref 4.8–10.8)

## 2024-04-07 PROCEDURE — 770020 HCHG ROOM/CARE - TELE (206)

## 2024-04-07 PROCEDURE — A9270 NON-COVERED ITEM OR SERVICE: HCPCS

## 2024-04-07 PROCEDURE — A9270 NON-COVERED ITEM OR SERVICE: HCPCS | Performed by: INTERNAL MEDICINE

## 2024-04-07 PROCEDURE — 700102 HCHG RX REV CODE 250 W/ 637 OVERRIDE(OP): Performed by: INTERNAL MEDICINE

## 2024-04-07 PROCEDURE — 700102 HCHG RX REV CODE 250 W/ 637 OVERRIDE(OP): Performed by: STUDENT IN AN ORGANIZED HEALTH CARE EDUCATION/TRAINING PROGRAM

## 2024-04-07 PROCEDURE — 70551 MRI BRAIN STEM W/O DYE: CPT

## 2024-04-07 PROCEDURE — 80048 BASIC METABOLIC PNL TOTAL CA: CPT

## 2024-04-07 PROCEDURE — 700111 HCHG RX REV CODE 636 W/ 250 OVERRIDE (IP)

## 2024-04-07 PROCEDURE — 99233 SBSQ HOSP IP/OBS HIGH 50: CPT | Performed by: INTERNAL MEDICINE

## 2024-04-07 PROCEDURE — 700102 HCHG RX REV CODE 250 W/ 637 OVERRIDE(OP)

## 2024-04-07 PROCEDURE — A9270 NON-COVERED ITEM OR SERVICE: HCPCS | Performed by: STUDENT IN AN ORGANIZED HEALTH CARE EDUCATION/TRAINING PROGRAM

## 2024-04-07 PROCEDURE — 36415 COLL VENOUS BLD VENIPUNCTURE: CPT

## 2024-04-07 PROCEDURE — 85027 COMPLETE CBC AUTOMATED: CPT

## 2024-04-07 PROCEDURE — 99222 1ST HOSP IP/OBS MODERATE 55: CPT | Performed by: PSYCHIATRY & NEUROLOGY

## 2024-04-07 RX ADMIN — FUROSEMIDE 20 MG: 20 TABLET ORAL at 05:13

## 2024-04-07 RX ADMIN — DAPAGLIFLOZIN 10 MG: 10 TABLET, FILM COATED ORAL at 05:13

## 2024-04-07 RX ADMIN — ASPIRIN 81 MG: 81 TABLET, COATED ORAL at 05:14

## 2024-04-07 RX ADMIN — CARVEDILOL 12.5 MG: 12.5 TABLET, FILM COATED ORAL at 08:18

## 2024-04-07 RX ADMIN — APIXABAN 5 MG: 5 TABLET, FILM COATED ORAL at 05:17

## 2024-04-07 RX ADMIN — LORAZEPAM 0.5 MG: 2 INJECTION, SOLUTION INTRAMUSCULAR; INTRAVENOUS at 09:28

## 2024-04-07 RX ADMIN — ATORVASTATIN CALCIUM 40 MG: 40 TABLET, FILM COATED ORAL at 17:52

## 2024-04-07 RX ADMIN — LOSARTAN POTASSIUM 25 MG: 25 TABLET, FILM COATED ORAL at 17:52

## 2024-04-07 RX ADMIN — APIXABAN 5 MG: 5 TABLET, FILM COATED ORAL at 17:52

## 2024-04-07 RX ADMIN — CARVEDILOL 12.5 MG: 12.5 TABLET, FILM COATED ORAL at 17:52

## 2024-04-07 RX ADMIN — SPIRONOLACTONE 25 MG: 25 TABLET ORAL at 05:13

## 2024-04-07 ASSESSMENT — ENCOUNTER SYMPTOMS
FEVER: 0
ABDOMINAL PAIN: 0
DIARRHEA: 0
SHORTNESS OF BREATH: 1
NAUSEA: 0
VOMITING: 0
COUGH: 1
CHILLS: 0

## 2024-04-07 NOTE — PROGRESS NOTES
NOC APRN CROSS COVER NOTE      Responded to rapid response called for code stroke related to CT head results.     CT head was ordered by prior APRN for patient with feeling of pressure in his head.     CT head demonstrates:  1.  No acute intracranial hemorrhage.  2.  Multiple infarcts that appear to be of varying chronicity involving the left cerebellum, right frontal and parietal lobes, and the left temporal lobe. MRI is recommended for further evaluation.  3.  Lacunar infarct left frontal lobe white matter, possibly chronic.  4.  White matter disease likely representing microvascular ischemic changes.  5.  Atrophy.  6.  Chronic right maxillary sinusitis.    To bedside to evaluate patient. Patient with no focal neuro deficits, patient alert, awake and answering questions appropriately. Per patient, he has history of prior CVA. VSS. Explained CT results and the need for MRI for further evaluation. He is agreeable.    Patient currently on Eliquis, aspirin and Lipitor.     Orders placed for urgent MRI brain and q 4 neuro checks.     Adenike Trammell [facility]-, NOC Hospitalist APRN      Addendum 0400: MRI brain completed and demonstrates:   1. Acute ischemic infarcts within the left cerebellum, right periventricular white matter, and right parietal lobe.  2. Old right frontal and parietal cortical infarcts.  3. Moderate chronic ischemic white matter demyelination.  4. Right maxillary sinus opacification, consistent with chronic maxillary sinusitis.    Patient remains without focal neuro deficits. I have reached out to Dr. Lomas from neurology who will kindly consult on patient. I have updated the bedside RN.

## 2024-04-07 NOTE — CARE PLAN
The patient is Watcher - Medium risk of patient condition declining or worsening    Shift Goals  Clinical Goals: Wean O2, IS use, fall precutions  Patient Goals: D/C  Family Goals: TOSIN    Progress made toward(s) clinical / shift goals:    Problem: Pain - Standard  Goal: Alleviation of pain or a reduction in pain to the patient’s comfort goal  Outcome: Progressing     Problem: Knowledge Deficit - Standard  Goal: Patient and family/care givers will demonstrate understanding of plan of care, disease process/condition, diagnostic tests and medications  Outcome: Progressing     Problem: Respiratory  Goal: Patient will achieve/maintain optimum respiratory ventilation and gas exchange  Outcome: Progressing     Problem: Fall Risk  Goal: Patient will remain free from falls  Outcome: Progressing       Pt received CT scan tonight for c/o fullness in the head, results concerning suggesting possible acute neuro incident. Rapid response called, STAT MRI completed pending results.

## 2024-04-07 NOTE — ASSESSMENT & PLAN NOTE
His CT showed CVA, MRI brain was done showing acute CVA  Neurology was consulted appreciate rec.  Patient already on eliquis an aspirin     4/8: a rapid response was called last night and code stroke as patient was having vision changes per neurology not a candidate for TPA since already in eliquis   Repeat Ct head no changes from the previous one   Seen again by neurology cont on aspirin and eliquis appreciate rec.

## 2024-04-07 NOTE — PROGRESS NOTES
Hospital Medicine Daily Progress Note    Date of Service  4/7/2024    Chief Complaint  Ministerio Tenorio is a 68 y.o. male admitted 4/2/2024 with chest pain and SOB      Hospital Course  This is a 68 y.o. male who was transferred from outside facility 4/2/2024 with shortness of breath and chest pain.  PMH of CAD s/p 4 stents in 2022, s/p AICD, hypertension, dyslipidemia, PE.  Patient said about 7 or 8 months ago he stopped taking all his medications because he could not reach his doctors in [place].  For the past 4 days he has had shortness of breath, right-sided sharp chest pain that came on at rest, productive cough.  Denies fever/chills, no hemoptysis.   He was found to have an elevated BNP concern for HF, echo showed EF of 15% and cardiology was consulted.  Patient admitted for further treatment     Interval Problem Update  Patient seen and examined, afebrile, resting in bed, denies chest pain, seen by cardiology and case discussed, now GDMT also on lasix,   Regarding his LV thrombus, started on initially on heparin héctor switch to eliquis  Wean off O2 as tolerated   4/4: Patient resting in bed, afebrile, still with SOB, on 4L of oxygen   Cont on Lasix and also on eliquis for his LV thrombus  Wean off O2 as tolerated   4/5: Patient seen and examined, started having nose bleed this AM, held eliquis initially and started phenylephrine nasal spray   His bleeding has now stopped,  Will restart eliquis on 5 mg this afternoon instead of 10   Close monitor for bleeding   4/6: Patient seen and examined, afebrile, no nausea or vomiting feels weak, his BP on the low side will decrease lasix dose to 20 mg.  Cont on eliquis no more nose bleed   Close monitoring on tele   4/7: Patient seen and examined, afebrile, no nausea or vomting, he had a CT head yesterday showing possible CVA. MRI brain was done showing acute CVA   Neurology was consulted appreciate rec.     I have discussed this patient's plan of care and discharge  plan at IDT rounds today with Case Management, Nursing, Nursing leadership, and other members of the IDT team.    Consultants/Specialty  cardiology    Code Status  Full Code    Disposition  The patient is not medically cleared for discharge to home or a post-acute facility.      I have placed the appropriate orders for post-discharge needs.    Review of Systems  Review of Systems   Constitutional:  Negative for chills and fever.   Respiratory:  Positive for cough and shortness of breath.    Cardiovascular:  Negative for chest pain.   Gastrointestinal:  Negative for abdominal pain, diarrhea, nausea and vomiting.   Genitourinary:  Negative for dysuria and urgency.        Physical Exam  Temp:  [36.3 °C (97.3 °F)-36.4 °C (97.5 °F)] 36.4 °C (97.5 °F)  Pulse:  [66-79] 76  Resp:  [17-20] 19  BP: (106-124)/(63-72) 113/63  SpO2:  [90 %-97 %] 90 %    Physical Exam  Constitutional:       Appearance: Normal appearance.   HENT:      Head: Normocephalic and atraumatic.      Mouth/Throat:      Mouth: Mucous membranes are moist.      Pharynx: Oropharynx is clear. No oropharyngeal exudate or posterior oropharyngeal erythema.   Eyes:      General: No scleral icterus.  Cardiovascular:      Rate and Rhythm: Normal rate and regular rhythm.      Pulses: Normal pulses.      Heart sounds: Normal heart sounds. No murmur heard.  Pulmonary:      Effort: Pulmonary effort is normal. No respiratory distress.      Breath sounds: Normal breath sounds. No wheezing.   Abdominal:      Palpations: Abdomen is soft.      Tenderness: There is no abdominal tenderness.   Musculoskeletal:         General: No swelling or tenderness. Normal range of motion.      Cervical back: Normal range of motion.   Skin:     General: Skin is warm and dry.   Neurological:      General: No focal deficit present.      Mental Status: He is alert and oriented to person, place, and time. Mental status is at baseline.   Psychiatric:         Mood and Affect: Mood normal.          Fluids    Intake/Output Summary (Last 24 hours) at 4/7/2024 1311  Last data filed at 4/7/2024 1124  Gross per 24 hour   Intake 1638 ml   Output 1100 ml   Net 538 ml       Laboratory  Recent Labs     04/05/24  0746 04/05/24  1325 04/06/24  0119 04/07/24  0107   WBC 9.6  --  8.2 7.1   RBC 4.78  --  4.73 4.55*   HEMOGLOBIN 14.9 13.9* 14.5 14.1   HEMATOCRIT 43.7 41.0* 43.6 41.0*   MCV 91.4  --  92.2 90.1   MCH 31.2  --  30.7 31.0   MCHC 34.1  --  33.3 34.4   RDW 42.2  --  43.5 42.2   PLATELETCT 253  --  244 223   MPV 11.2  --  11.4 11.5     Recent Labs     04/05/24  0746 04/06/24  0119 04/07/24  0107   SODIUM 137 138 135   POTASSIUM 4.0 4.2 3.9   CHLORIDE 103 101 100   CO2 22 24 23   GLUCOSE 102* 104* 150*   BUN 24* 25* 21   CREATININE 1.15 1.13 1.04   CALCIUM 8.7 8.8 8.4*                       Imaging  MR-BRAIN-W/O   Final Result         1. Acute ischemic infarcts within the left cerebellum, right periventricular white matter, and right parietal lobe.   2. Old right frontal and parietal cortical infarcts.   3. Moderate chronic ischemic white matter demyelination.   4. Right maxillary sinus opacification, consistent with chronic maxillary sinusitis.         CT-HEAD W/O   Final Result      1.  No acute intracranial hemorrhage.   2.  Multiple infarcts that appear to be of varying chronicity involving the left cerebellum, right frontal and parietal lobes, and the left temporal lobe. MRI is recommended for further evaluation.   3.  Lacunar infarct left frontal lobe white matter, possibly chronic.   4.  White matter disease likely representing microvascular ischemic changes.   5.  Atrophy.   6.  Chronic right maxillary sinusitis.         EC-ECHOCARDIOGRAM COMPLETE W/O CONT   Final Result      DX-CHEST-PORTABLE (1 VIEW)   Final Result         1.  Scattered hazy bilateral pulmonary infiltrates, greatest in the right upper lobe.   2.  Trace bilateral pleural effusions   3.  Cardiomegaly   4.  Atherosclerosis            Assessment/Plan  * LV (left ventricular) mural thrombus  Assessment & Plan  Was on heparin drip   Will switch to eliquis with loading dose  Close monitoring on tele   Cardiology following case discussed appreciate rec.       Systolic HF (heart failure) ([facility])  Assessment & Plan  Cardiology following and case discussed , now on GDMT  Wean off O2   Appreciate rec.     Stroke ([facility])  Assessment & Plan  His CT showed CVA, MRI brain was done showing acute CVA  Neurology was consulted appreciate rec.  Patient already on eliquis an aspirin         History of pulmonary embolus (PE)- (present on admission)  Assessment & Plan  Patient denies any history of any blood clot in his lungs and is on apixaban  He stopped taking all his meds.  CTA at outside facility negative for PE  Outpatient follow-up    Pneumonia of both upper lobes due to infectious organism- (present on admission)  Assessment & Plan  Patient feeling shortness of breath, has had a productive cough for the past 4 days.  Denies fever/chills  Respiratory panel, procalcitonin ordered  He was started on antibiotics at outside facility, will continue for now    Dyslipidemia- (present on admission)  Assessment & Plan  He stopped taking his meds.  Restart atorvastatin    Coronary artery disease involving native coronary artery of native heart without angina pectoris- (present on admission)  Assessment & Plan  Said he had 4 stents placed 2022.  Has not been on any of his medications  Restart aspirin    Essential hypertension- (present on admission)  Assessment & Plan  Stop taking all his medications few months ago because he could not follow his physician  Restart metoprolol.  As needed antihypertensives ordered  Monitor and adjust as needed     Acute respiratory failure with hypoxia (HCC)- (present on admission)  Assessment & Plan  Requiring fluid on my evaluation, desaturating on oxygen was lowered  Possible related to systolic HF now on lasix also concern for  pneumonia cont on abx switching to Augmentin  Wean off O 2 as tolerated        VTE prophylaxis: Eliquis     I have performed a physical exam and reviewed and updated ROS and Plan today (4/7/2024). In review of yesterday's note (4/6/2024), there are no changes except as documented above.    Greater than 51 minutes spent prepping to see patient (e.g. review of tests) obtaining and/or reviewing separately obtained history. Performing a medically appropriate examination and/ evaluation.  Counseling and educating the patient/family/caregiver.  Ordering medications, tests, or procedures.  Referring and communicating with other health care professionals.  Documenting clinical information in EPIC.  Independently interpreting results and communicating results to patient/family/caregiver.  Care coordination.

## 2024-04-07 NOTE — CONSULTS
Neurology STROKE H&P  Neurohospitalist Service, [facility], formerly [facility]iences    Referring Physician: Avani Mcbride M.D.    STROKE:   Chief Complaint   Patient presents with    Chest Pain     x4days    Shortness of Breath     X4 days, worsens with exertion          To obtain the most accurate data regarding the time called, and time patient seen, refer to the stroke run-sheet and chart.  For time of CT, refer to the radiology report. See A&P below for TPA Decision and door to needle time if and when applicable.    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  His initial evaluation revealed elevated BNP with concern for heart failure and his echocardiogram revealed ejection fraction of 15% with evidence of left ventricular mural thrombus.  He was initially started on heparin drip and subsequently switched to Eliquis and aspirin.  Last night patient was complaining of feeling of pressure in his head for which a brain CT was obtained revealing multiple infarcts that appears to be of varying chronicity involving the left cerebellum, right frontal and parietal head region.  Subsequently a brain MRI was obtained which revealed acute ischemic infarct within the left cerebellum, right periventricular white matter and right parietal head region.  Patient remains asymptomatic with no focal neurological deficit.    Review of systems: In addition to what is detailed in the HPI above, all other systems reviewed and are negative.    Past Medical History:    has a past medical history of Congestive heart failure (HCC) and Pneumonia.    FHx:  family history is not on  file.    SHx:   reports that he has never smoked. He has never used smokeless tobacco. He reports that he does not currently use alcohol after a past usage of about 0.6 oz of alcohol per week. He reports current drug use.    Allergies:  No Known Allergies    Medications:    Current Facility-Administered Medications:     furosemide (Lasix) tablet 20 mg, 20 mg, Oral, Q DAY, Avani Mcbride M.D., 20 mg at 04/07/24 0513    phenylephrine (Neosynephrine) 1 % nasal spray 1 Spray, 1 Spray, Nasal, TID PRN, Avani Mcbride M.D., 1 [place] at 04/05/24 1152    apixaban (Eliquis) tablet 5 mg, 5 mg, Oral, BID, Avani Mcbride M.D., 5 mg at 04/07/24 0517    ALPRAZolam (Xanax) tablet 0.25 mg, 0.25 mg, Oral, BID PRN, Adenike Trammell, A.P.R.N., 0.25 mg at 04/05/24 1813    carvedilol (Coreg) tablet 12.5 mg, 12.5 mg, Oral, BID WITH MEALS, Amy Wilcox, A.P.R.N., 12.5 mg at 04/07/24 0818    acetaminophen (Tylenol) tablet 650 mg, 650 mg, Oral, Q6HRS PRN, Melita Palomares M.D., 650 mg at 04/04/24 0457    labetalol (Normodyne/Trandate) injection 10 mg, 10 mg, Intravenous, Q4HRS PRN, Melita Palomares M.D.    aspirin EC tablet 81 mg, 81 mg, Oral, DAILY, Melita Palomares M.D., 81 mg at 04/07/24 0514    atorvastatin (Lipitor) tablet 40 mg, 40 mg, Oral, Q EVENING, Melita Palomares M.D., 40 mg at 04/06/24 1718    spironolactone (Aldactone) tablet 25 mg, 25 mg, Oral, Q DAY, Alejandro Patiño M.D., 25 mg at 04/07/24 0513    dapagliflozin propanediol (Farxiga) tablet 10 mg, 10 mg, Oral, DAILY, Alejandro Haroon, M.D., 10 mg at 04/07/24 0513    losartan (Cozaar) tablet 25 mg, 25 mg, Oral, DAILY AT 1800, Alejandro Patiño M.D., 25 mg at 04/06/24 1718    scopolamine (Transderm-Scop) patch 1 Patch, 1 Patch, Transdermal, Q72HRS, Emi Goode, A.P.R.N., 1 Patch at 04/05/24 1954    LORazepam (Ativan) injection 0.5 mg, 0.5 mg, Intravenous, Q4HRS PRN, Emi Goode, A.P.R.N., 0.5 mg at 04/07/24 0928    Physical Examination:    Vitals:    04/07/24 0500 04/07/24 0600  04/07/24 0727 04/07/24 1124   BP:  124/72 121/65 113/63   Pulse: 75 76 74 76   Resp:   20 19   Temp:   36.3 °C (97.3 °F) 36.4 °C (97.5 °F)   TempSrc:   Temporal Temporal   SpO2:  96% 91% 90%   Weight:       Height:           General:   Patient is awake and in no acute distress  Neck: Full range of motion  Eyes: Midline, Pupils reactive to light.  CV: RRR  Lungs: No respiratory distress  Extremities: No cyanosis, warm, no significant edema.    NEUROLOGICAL EXAM:   Mental status: Awake, alert and fully oriented, follows commands  Speech and language: speech is fluent and not dysarthric. The patient is able to name and repeat.  Cranial nerve exam: Pupils are equal, round and reactive to light bilaterally. Visual fields are full. Extraocular muscles are intact. Sensation in the face is intact to light touch. Face is symmetric. Hearing to finger rub equal. Palate elevates symmetrically. Shoulder shrug is full. Tongue is midline.  Motor exam: Sustain antigravity in all 4 extremities with no downward drift. Tone is normal. No abnormal movements were seen on exam.  Sensory exam: No sensory deficits identified   Coordination: no gross ataxia noted on exam  Plantar reflexes: Equivocal  Gait: deferred    NIH Stroke Scale:    1a. Level of Consciousness (Alert, drowsy, etc): 0= Alert    1b. LOC Questions (Month, age): 0= Answers both correctly    1c. LOC Commands (Open/close eyes make fist/let go): 0= Obeys both correctly    2.   Best Gaze (Eyes open - patient follows examiner's finger on face): 0= Normal    3.   Visual Fields (introduce visual stimulus/threat to patient's field quadrants): 0= No visual loss  4.   Facial Paresis (Show teeth, raise eyebrows and squeeze eyes shut): 0= Normal     5a. Motor Arm - Left (Elevate arm to 90 degrees if patient is sitting, 45 degrees if  supine): 0= No drift    5b. Motor Arm - Right (Elevate arm to 90 degrees if patient is sitting, 45 degrees if supine): 0= No drift    6a. Motor Leg -  Left (Elevate leg 30 degrees with patient supine): 0= No drift    6b. Motor Leg - Right  (Elevate leg 30 degrees with patient supine): 0= No drift    7.   Limb Ataxia (Finger-nose, heel down shin): 0= No ataxia    8.   Sensory (Pin prick to face, arm, trunk and leg - compare side to side): 0= Normal    9.  Best Language (Name item, describe a picture and read sentences): 0= No aphasia    10. Dysarthria (Evaluate speech clarity by patient repeating listed words): 0= Normal articulation    11. Extinction and Inattention (Use information from prior testing to identify neglect or  double simultaneous stimuli testing): 0= No neglect    Total NIH Score: 0    Baseline modified Hamlin Scale (MRS): 0 = No symptoms    Objective Data:    Labs:  Lab Results   Component Value Date/Time    PROTHROMBTM 18.4 (H) 04/02/2024 02:39 PM    INR 1.51 (H) 04/02/2024 02:39 PM      Lab Results   Component Value Date/Time    WBC 7.1 04/07/2024 01:07 AM    RBC 4.55 (L) 04/07/2024 01:07 AM    HEMOGLOBIN 14.1 04/07/2024 01:07 AM    HEMATOCRIT 41.0 (L) 04/07/2024 01:07 AM    MCV 90.1 04/07/2024 01:07 AM    MCH 31.0 04/07/2024 01:07 AM    MCHC 34.4 04/07/2024 01:07 AM    MPV 11.5 04/07/2024 01:07 AM    NEUTSPOLYS 74.00 (H) 04/02/2024 02:55 AM    LYMPHOCYTES 18.30 (L) 04/02/2024 02:55 AM    MONOCYTES 7.20 04/02/2024 02:55 AM    EOSINOPHILS 0.10 04/02/2024 02:55 AM    BASOPHILS 0.20 04/02/2024 02:55 AM      Lab Results   Component Value Date/Time    SODIUM 135 04/07/2024 01:07 AM    POTASSIUM 3.9 04/07/2024 01:07 AM    CHLORIDE 100 04/07/2024 01:07 AM    CO2 23 04/07/2024 01:07 AM    GLUCOSE 150 (H) 04/07/2024 01:07 AM    BUN 21 04/07/2024 01:07 AM    CREATININE 1.04 04/07/2024 01:07 AM      Lab Results   Component Value Date/Time    CHOLSTRLTOT 138 04/02/2024 02:51 AM    LDL 88 04/02/2024 02:51 AM    HDL 32 (A) 04/02/2024 02:51 AM    TRIGLYCERIDE 90 04/02/2024 02:51 AM       Lab Results   Component Value Date/Time    ALKPHOSPHAT 92 03/02/2021 08:00  PM    ASTSGOT 38 (H) 03/02/2021 08:00 PM    ALTSGPT 50 03/02/2021 08:00 PM    TBILIRUBIN 2.5 (H) 03/02/2021 08:00 PM        Imaging/Testing:    I interpreted and/or reviewed the patient's neuroimaging    MR-BRAIN-W/O   Final Result         1. Acute ischemic infarcts within the left cerebellum, right periventricular white matter, and right parietal lobe.   2. Old right frontal and parietal cortical infarcts.   3. Moderate chronic ischemic white matter demyelination.   4. Right maxillary sinus opacification, consistent with chronic maxillary sinusitis.         CT-HEAD W/O   Final Result      1.  No acute intracranial hemorrhage.   2.  Multiple infarcts that appear to be of varying chronicity involving the left cerebellum, right frontal and parietal lobes, and the left temporal lobe. MRI is recommended for further evaluation.   3.  Lacunar infarct left frontal lobe white matter, possibly chronic.   4.  White matter disease likely representing microvascular ischemic changes.   5.  Atrophy.   6.  Chronic right maxillary sinusitis.         EC-ECHOCARDIOGRAM COMPLETE W/O CONT   Final Result      DX-CHEST-PORTABLE (1 VIEW)   Final Result         1.  Scattered hazy bilateral pulmonary infiltrates, greatest in the right upper lobe.   2.  Trace bilateral pleural effusions   3.  Cardiomegaly   4.  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  His initial evaluation revealed elevated BNP with concern for heart failure and his echocardiogram revealed ejection fraction of 15% with evidence of left ventricular mural thrombus.  He was  initially started on heparin drip and subsequently switched to Eliquis and aspirin.  Last night patient was complaining of feeling of pressure in his head for which a brain CT was obtained revealing multiple infarcts that appears to be of varying chronicity involving the left cerebellum, right frontal and parietal head region.  Subsequently a brain MRI was obtained which revealed acute ischemic infarct within the left cerebellum, right periventricular white matter and right parietal head region.  Patient remains asymptomatic with no focal neurological deficit.  Etiology likely cardioembolic given presence of moral thrombus in left ventricle.  Patient is already optimized on therapies with Eliquis and also aspirin for his coronary artery disease  No acute stroke intervention is required.  Continue current management per primary team and cardiology.  Neurology will sign off, please call us if there is any question.     The plan of care above has been discussed with Avani Mcbride M.D.        Please note that this dictation was created using voice recognition software. I have made every reasonable attempt to correct obvious errors, but I expect that there are errors of grammar and possibly content that I did not discover before finalizing the note.       Codi Lomas MD  Acute Care Neurology Services

## 2024-04-07 NOTE — PROGRESS NOTES
Handoff report received from day shift nurse. Pt care assumed. Pt is currently resting in bed awake and NAD noted. POC discussed with Pt and Pt verbalizes no questions or needs at this time. Pt is AAOx4, on 1.5L NC, on Tele monitoring with rate and rhythm verified, and VSS. Call light and belongings within reach, bed in lowest and locked position, fall precautions in place and Pt educated on use of call light. Hourly rounding in place.

## 2024-04-07 NOTE — DISCHARGE PLANNING
Received Choice form at 0471  Agency/Facility Name: [facility]  Referral sent per Choice form @ 1414    Scanned DME choice form in media

## 2024-04-07 NOTE — PROGRESS NOTES
Pt c/o pressure in head. Neuro assessment performed, pupils noted to be sluggish to react but equal with no other neuro deficits noted. Contacted ELLA Goode, head CT ordered.

## 2024-04-07 NOTE — PROGRESS NOTES
Change in patient condition due to: Other, CT results concerning for acute infarct.   Rapid Response called at: 0222  Physician Griffiths notified at 0222  ELLA Trammell came to bedside, STAT MRI ordered    See Code Blue timeline for rapid response events. Patient Remained on Unit

## 2024-04-07 NOTE — DISCHARGE PLANNING
Case Management Discharge Planning    Admission Date: 4/2/2024  GMLOS: 4.1  ALOS: 5    6-Clicks ADL Score: 18  6-Clicks Mobility Score: 20      Anticipated Discharge Dispo: Discharge Disposition: Discharged to home/self care (01)    DME Needed: Yes    DME Ordered: Yes Referral sent to [facility] for hm 02 with [employer].    Action(s) Taken: Choice obtained and Referral(s) sent    Escalations Completed: None    Medically Clear: No    Next Steps: Patient may be able to discharge tomorrow (4/8 Monday)    Barriers to Discharge: Medical clearance and DME    Is the patient up for discharge tomorrow: Yes    Is transport arranged for discharge disposition: No  Per rounds- possible discharge on 4/8 (Monday)

## 2024-04-08 ENCOUNTER — APPOINTMENT (OUTPATIENT)
Dept: RADIOLOGY | Facility: MEDICAL CENTER | Age: 69
DRG: 193 | End: 2024-04-08
Payer: MEDICARE

## 2024-04-08 LAB
ALBUMIN SERPL BCP-MCNC: 3.1 G/DL (ref 3.2–4.9)
ALBUMIN/GLOB SERPL: 1.1 G/DL
ALP SERPL-CCNC: 101 U/L (ref 30–99)
ALT SERPL-CCNC: 33 U/L (ref 2–50)
ANION GAP SERPL CALC-SCNC: 9 MMOL/L (ref 7–16)
AST SERPL-CCNC: 23 U/L (ref 12–45)
BASE EXCESS BLDA CALC-SCNC: -2 MMOL/L (ref -4–3)
BASOPHILS # BLD AUTO: 0.4 % (ref 0–1.8)
BASOPHILS # BLD: 0.03 K/UL (ref 0–0.12)
BILIRUB SERPL-MCNC: 0.9 MG/DL (ref 0.1–1.5)
BODY TEMPERATURE: 36 CENTIGRADE
BUN SERPL-MCNC: 20 MG/DL (ref 8–22)
CALCIUM ALBUM COR SERPL-MCNC: 8.8 MG/DL (ref 8.5–10.5)
CALCIUM SERPL-MCNC: 8.1 MG/DL (ref 8.5–10.5)
CHLORIDE SERPL-SCNC: 104 MMOL/L (ref 96–112)
CO2 SERPL-SCNC: 21 MMOL/L (ref 20–33)
CREAT SERPL-MCNC: 1.04 MG/DL (ref 0.5–1.4)
EKG IMPRESSION: NORMAL
EOSINOPHIL # BLD AUTO: 0.08 K/UL (ref 0–0.51)
EOSINOPHIL NFR BLD: 1.2 % (ref 0–6.9)
ERYTHROCYTE [DISTWIDTH] IN BLOOD BY AUTOMATED COUNT: 41.7 FL (ref 35.9–50)
GFR SERPLBLD CREATININE-BSD FMLA CKD-EPI: 78 ML/MIN/1.73 M 2
GLOBULIN SER CALC-MCNC: 2.7 G/DL (ref 1.9–3.5)
GLUCOSE BLD STRIP.AUTO-MCNC: 115 MG/DL (ref 65–99)
GLUCOSE SERPL-MCNC: 127 MG/DL (ref 65–99)
HCO3 BLDA-SCNC: 22 MMOL/L (ref 17–25)
HCT VFR BLD AUTO: 40.3 % (ref 42–52)
HGB BLD-MCNC: 14.2 G/DL (ref 14–18)
IMM GRANULOCYTES # BLD AUTO: 0.01 K/UL (ref 0–0.11)
IMM GRANULOCYTES NFR BLD AUTO: 0.1 % (ref 0–0.9)
INHALED O2 FLOW RATE: ABNORMAL L/MIN
LACTATE SERPL-SCNC: 1.3 MMOL/L (ref 0.5–2)
LYMPHOCYTES # BLD AUTO: 1.35 K/UL (ref 1–4.8)
LYMPHOCYTES NFR BLD: 20.1 % (ref 22–41)
MAGNESIUM SERPL-MCNC: 2.4 MG/DL (ref 1.5–2.5)
MCH RBC QN AUTO: 31.5 PG (ref 27–33)
MCHC RBC AUTO-ENTMCNC: 35.2 G/DL (ref 32.3–36.5)
MCV RBC AUTO: 89.4 FL (ref 81.4–97.8)
MONOCYTES # BLD AUTO: 0.48 K/UL (ref 0–0.85)
MONOCYTES NFR BLD AUTO: 7.2 % (ref 0–13.4)
NEUTROPHILS # BLD AUTO: 4.76 K/UL (ref 1.82–7.42)
NEUTROPHILS NFR BLD: 71 % (ref 44–72)
NRBC # BLD AUTO: 0 K/UL
NRBC BLD-RTO: 0 /100 WBC (ref 0–0.2)
PCO2 BLDA: 37.9 MMHG (ref 26–37)
PCO2 TEMP ADJ BLDA: 36.3 MMHG (ref 26–37)
PH BLDA: 7.39 [PH] (ref 7.4–7.5)
PH TEMP ADJ BLDA: 7.4 [PH] (ref 7.4–7.5)
PHOSPHATE SERPL-MCNC: 3.5 MG/DL (ref 2.5–4.5)
PLATELET # BLD AUTO: 285 K/UL (ref 164–446)
PMV BLD AUTO: 11.5 FL (ref 9–12.9)
PO2 BLDA: 49.7 MMHG (ref 64–87)
PO2 TEMP ADJ BLDA: 46.3 MMHG (ref 64–87)
POTASSIUM SERPL-SCNC: 4 MMOL/L (ref 3.6–5.5)
PROT SERPL-MCNC: 5.8 G/DL (ref 6–8.2)
RBC # BLD AUTO: 4.51 M/UL (ref 4.7–6.1)
SAO2 % BLDA: 80.3 % (ref 93–99)
SODIUM SERPL-SCNC: 134 MMOL/L (ref 135–145)
TROPONIN T SERPL-MCNC: 48 NG/L (ref 6–19)
TROPONIN T SERPL-MCNC: 49 NG/L (ref 6–19)
TROPONIN T SERPL-MCNC: 53 NG/L (ref 6–19)
WBC # BLD AUTO: 6.7 K/UL (ref 4.8–10.8)

## 2024-04-08 PROCEDURE — 82962 GLUCOSE BLOOD TEST: CPT

## 2024-04-08 PROCEDURE — A9270 NON-COVERED ITEM OR SERVICE: HCPCS | Performed by: INTERNAL MEDICINE

## 2024-04-08 PROCEDURE — 700117 HCHG RX CONTRAST REV CODE 255

## 2024-04-08 PROCEDURE — 82803 BLOOD GASES ANY COMBINATION: CPT

## 2024-04-08 PROCEDURE — 700102 HCHG RX REV CODE 250 W/ 637 OVERRIDE(OP)

## 2024-04-08 PROCEDURE — A9270 NON-COVERED ITEM OR SERVICE: HCPCS

## 2024-04-08 PROCEDURE — 770020 HCHG ROOM/CARE - TELE (206)

## 2024-04-08 PROCEDURE — 70498 CT ANGIOGRAPHY NECK: CPT

## 2024-04-08 PROCEDURE — 70496 CT ANGIOGRAPHY HEAD: CPT

## 2024-04-08 PROCEDURE — 85025 COMPLETE CBC W/AUTO DIFF WBC: CPT

## 2024-04-08 PROCEDURE — 83735 ASSAY OF MAGNESIUM: CPT

## 2024-04-08 PROCEDURE — 70450 CT HEAD/BRAIN W/O DYE: CPT

## 2024-04-08 PROCEDURE — 93005 ELECTROCARDIOGRAM TRACING: CPT

## 2024-04-08 PROCEDURE — 84484 ASSAY OF TROPONIN QUANT: CPT

## 2024-04-08 PROCEDURE — 700102 HCHG RX REV CODE 250 W/ 637 OVERRIDE(OP): Performed by: STUDENT IN AN ORGANIZED HEALTH CARE EDUCATION/TRAINING PROGRAM

## 2024-04-08 PROCEDURE — 99233 SBSQ HOSP IP/OBS HIGH 50: CPT | Performed by: INTERNAL MEDICINE

## 2024-04-08 PROCEDURE — 83605 ASSAY OF LACTIC ACID: CPT

## 2024-04-08 PROCEDURE — A9270 NON-COVERED ITEM OR SERVICE: HCPCS | Performed by: STUDENT IN AN ORGANIZED HEALTH CARE EDUCATION/TRAINING PROGRAM

## 2024-04-08 PROCEDURE — 80053 COMPREHEN METABOLIC PANEL: CPT

## 2024-04-08 PROCEDURE — 700111 HCHG RX REV CODE 636 W/ 250 OVERRIDE (IP): Mod: JZ

## 2024-04-08 PROCEDURE — 84100 ASSAY OF PHOSPHORUS: CPT

## 2024-04-08 PROCEDURE — 93010 ELECTROCARDIOGRAM REPORT: CPT | Performed by: INTERNAL MEDICINE

## 2024-04-08 PROCEDURE — 36415 COLL VENOUS BLD VENIPUNCTURE: CPT

## 2024-04-08 PROCEDURE — 700102 HCHG RX REV CODE 250 W/ 637 OVERRIDE(OP): Performed by: INTERNAL MEDICINE

## 2024-04-08 RX ORDER — ONDANSETRON 2 MG/ML
4 INJECTION INTRAMUSCULAR; INTRAVENOUS ONCE
Status: COMPLETED | OUTPATIENT
Start: 2024-04-08 | End: 2024-04-08

## 2024-04-08 RX ADMIN — ONDANSETRON 4 MG: 2 INJECTION INTRAMUSCULAR; INTRAVENOUS at 00:38

## 2024-04-08 RX ADMIN — CARVEDILOL 12.5 MG: 12.5 TABLET, FILM COATED ORAL at 17:22

## 2024-04-08 RX ADMIN — DAPAGLIFLOZIN 10 MG: 10 TABLET, FILM COATED ORAL at 05:22

## 2024-04-08 RX ADMIN — IOHEXOL 80 ML: 350 INJECTION, SOLUTION INTRAVENOUS at 00:45

## 2024-04-08 RX ADMIN — SCOPOLAMINE 1 PATCH: 1.5 PATCH, EXTENDED RELEASE TRANSDERMAL at 19:53

## 2024-04-08 RX ADMIN — ASPIRIN 81 MG: 81 TABLET, COATED ORAL at 05:22

## 2024-04-08 RX ADMIN — CARVEDILOL 12.5 MG: 12.5 TABLET, FILM COATED ORAL at 08:14

## 2024-04-08 RX ADMIN — SPIRONOLACTONE 25 MG: 25 TABLET ORAL at 05:23

## 2024-04-08 RX ADMIN — IOHEXOL 40 ML: 350 INJECTION, SOLUTION INTRAVENOUS at 00:45

## 2024-04-08 RX ADMIN — LOSARTAN POTASSIUM 25 MG: 25 TABLET, FILM COATED ORAL at 17:22

## 2024-04-08 RX ADMIN — ATORVASTATIN CALCIUM 40 MG: 40 TABLET, FILM COATED ORAL at 17:22

## 2024-04-08 RX ADMIN — APIXABAN 5 MG: 5 TABLET, FILM COATED ORAL at 05:23

## 2024-04-08 RX ADMIN — APIXABAN 5 MG: 5 TABLET, FILM COATED ORAL at 17:22

## 2024-04-08 RX ADMIN — FUROSEMIDE 20 MG: 20 TABLET ORAL at 05:22

## 2024-04-08 ASSESSMENT — ENCOUNTER SYMPTOMS
ABDOMINAL PAIN: 0
COUGH: 1
DIARRHEA: 0
SHORTNESS OF BREATH: 1
CHILLS: 0
VOMITING: 0
NAUSEA: 0
FEVER: 0

## 2024-04-08 ASSESSMENT — FIBROSIS 4 INDEX: FIB4 SCORE: 0.96

## 2024-04-08 NOTE — PROGRESS NOTES
Stroke neuro brief note    Stroke alert activation for subjective blurry vision, LUE weakness, transient R face droop, lethargy.  FSBS 115.  VSS with SBPs in 120s.  On my assessment, he is lethargic, however easily arousable and follows commands.  His speech is clear, and he no clear face droop.  His LUE does have a mild downward drift.  Both legs are antigravity.  Patient known to stroke service for multifocal infarcts- likely cardioembolic etiology given LVEF 15% and associated LV thrombus on TTE from 4/2. He is currently on eliquis and ASA with last eliquis dose this evening.  Stroke protocol CT with no hemorrhage, no large vessel occlusion.  He is not a candidate for thrombolytic therapy given anticoagulation.  At this time, recommend continuing current therapies- including ASA, apixaban, and statin therapy.       Please call or reconsult Stroke Neurology with any additional questions or concerns.    Steven Apple MD  Stroke Neurology

## 2024-04-08 NOTE — CARE PLAN
The patient is Stable - Low risk of patient condition declining or worsening    Shift Goals  Clinical Goals: Monitor neuro, maintain safety  Patient Goals: D/C  Family Goals: TOSIN    Progress made toward(s) clinical / shift goals:    Problem: Knowledge Deficit - Standard  Goal: Patient and family/care givers will demonstrate understanding of plan of care, disease process/condition, diagnostic tests and medications  Outcome: Progressing     Problem: Self Care  Goal: Patient will have the ability to perform ADLs independently or with assistance (bathe, groom, dress, toilet and feed)  Outcome: Progressing

## 2024-04-08 NOTE — DOCUMENTATION QUERY
Counts include 234 beds at the [facility]                                                                       Query Response Note      PATIENT:               DASIA ORNELAS  ACCT #:                  [id number]  MRN:                     [id number]  :                      1955  ADMIT DATE:       2024 2:31 AM  DISCH DATE:          RESPONDING  PROVIDER #:        [id number]           QUERY TEXT:    Can the acuity of the heart failure be further specified based on the clinical indicators and required treatments?    The patient's Clinical Indicators include:  Cardiology Consult:  Heart failure with severely reduced ejection fraction.  Pt does not appear significantly volume overloaded, does not appear to be hypoperfusing at this time.  May be a component of URI causing some decompensation.    Progress Notes: systolic HF    Progress Note:  2D Echo reveals severe heart failure with reduced EF and LV thrombus   H&P:  trace pleural effusions, possible interstitial edema but no LE edema   NT-proBNP [id number]   Echo: EF 15%  Risk Factors: stopped all medications several months ago, htn, CAD  Treatment: IV Lasix, Carvedilol, Losartan, Spironolactone     Thank you,  Carmine Garcia RN, BSN, CCDS  Clinical   Connect via Cellity  Options provided:   -- Exacerbation or decompensation of HFrEF   -- Chronic HFrEF   -- Acute on Chronic HFrEF   -- Other explanation, (please specify other explanation)   -- Unable to determine      Query created by: Carmine Garcia on 2024 7:34 AM    RESPONSE TEXT:    Exacerbation or decompensation of HFrEF          Electronically signed by:  MUNIR MIRANDA MD 2024 8:19 AM

## 2024-04-08 NOTE — CARE PLAN
The patient is Stable - Low risk of patient condition declining or worsening    Shift Goals  Clinical Goals: Wean O2, IS use, fall precutions  Patient Goals: D/C  Family Goals: TOSIN    Progress made toward(s) clinical / shift goals:    Problem: Pain - Standard  Goal: Alleviation of pain or a reduction in pain to the patient’s comfort goal  Outcome: Progressing     Problem: Knowledge Deficit - Standard  Goal: Patient and family/care givers will demonstrate understanding of plan of care, disease process/condition, diagnostic tests and medications  Outcome: Progressing     Problem: Self Care  Goal: Patient will have the ability to perform ADLs independently or with assistance (bathe, groom, dress, toilet and feed)  Outcome: Progressing

## 2024-04-08 NOTE — PROGRESS NOTES
"Patient noted to have acute neuro changes. Patient reported \"pressure in head\", blurred vision. Patient pale, lethargic. Rapid response called at 23:56. ELLA Josue notified at 23:58. Rapid response and ELLA Trammell at bedside. Patient taken down for CT scan.   "

## 2024-04-08 NOTE — PROGRESS NOTES
Assumed care of PT A&O 4. Pt resting in bed with no signs of labored breathing. On 1L. Tele monitor in place, cardiac rhythm being monitored. Call light within reach, bed in lowest position, upper bed rails up. Pt was updated on plan of care for the day.

## 2024-04-08 NOTE — PROGRESS NOTES
NOC APRN CROSS COVER NOTE      Responded to rapid response for neurological changes.    Upon arrival to bedside, patient is lethargic, pale and diaphoretic. FSBS 115. He is having right vision changes described as blurry. Noted slurred speech, right facial droop and left arm drift. Code stoke initiated, patient taken to CT.    Orders placed for STAT EKG, ABG, lactic, CBC, CMP, mag, phos and troponin.     Dr. Apple at bedside on arrival back from CT. Per MD, continue current treatment and continue neuro checks q 4 hours.     EKG appears unchanged from previous, no ST segment changes noted.    ABG demonstrates PO2 of 49.7. Patient is saturating well on 2 L NC with good waveform on monitor. Therefore, I feel this is likely mixed venous.     CBC and CMP are relatively unremarkable. Lactic acid is negative at 1.3. Troponin is 48, up from baseline, will trend.     Adenike Trammell ACNPC-AG, NOC Hospitalist ELLA

## 2024-04-08 NOTE — PROGRESS NOTES
Monitor summary:        Rhythm: SR  Rate: 69-80  Ectopy: PVC  Measurements: .19/.10/.46        12hr chart check

## 2024-04-08 NOTE — PROGRESS NOTES
Writer notified by lab of critical- PO2: 49.7 and PO2-TC: 46.3. ELLA Josue notified. No new orders at this time.

## 2024-04-08 NOTE — DISCHARGE PLANNING
Case Management Discharge Planning    Admission Date: 4/2/2024  GMLOS: 4.1  ALOS: 6    6-Clicks ADL Score: 18  6-Clicks Mobility Score: 20      Anticipated Discharge Dispo: Discharge Disposition: Discharged to home/self care (01)    DME Needed: Yes    DME Ordered: Yes, 02    Action(s) Taken: Pt needs home 02. Lives in [place], insurance is [employer]. Referred to [facility] yesterday and they declined due to insurance.  Discussed in rounds today.  Pt had a CVA 4/7, not ready for discharge. Will need new Home 02 flow sheet within 24hrs of D/C.Previous choice for [facility] will be sent at the appropriate time.    Escalations Completed: None    Medically Clear: No    Next Steps: Await medical clearance and referral for home 02.    Barriers to Discharge: Medical clearance and DME    Is the patient up for discharge tomorrow: No

## 2024-04-08 NOTE — CARE PLAN
The patient is Watcher - Medium risk of patient condition declining or worsening    Shift Goals  Clinical Goals: Monitor neuro, maintain safety  Patient Goals: D/C  Family Goals: TOSIN    Progress made toward(s) clinical / shift goals:      Problem: Hemodynamics - Pneumonia  Goal: Patient's hemodynamics, fluid balance and neurologic status will be stable or improve  Outcome: Progressing     Problem: Pain - Standard  Goal: Alleviation of pain or a reduction in pain to the patient’s comfort goal  Outcome: Progressing     Problem: Fall Risk  Goal: Patient will remain free from falls  Outcome: Progressing       Patient is not progressing towards the following goals:

## 2024-04-08 NOTE — PROGRESS NOTES
[facility] Medicine Daily Progress Note    Date of Service  4/8/2024    Chief Complaint  Ministerio Tenorio is a 68 y.o. male admitted 4/2/2024 with chest pain and SOB      Hospital Course  This is a 68 y.o. male who was transferred from outside facility 4/2/2024 with shortness of breath and chest pain.  PMH of CAD s/p 4 stents in 2022, s/p AICD, hypertension, dyslipidemia, PE.  Patient said about 7 or 8 months ago he stopped taking all his medications because he could not reach his doctors in [place].  For the past 4 days he has had shortness of breath, right-sided sharp chest pain that came on at rest, productive cough.  Denies fever/chills, no hemoptysis.   He was found to have an elevated BNP concern for HF, echo showed EF of 15% and cardiology was consulted.  Patient admitted for further treatment     Interval Problem Update  Patient seen and examined, afebrile, resting in bed, denies chest pain, seen by cardiology and case discussed, now GDMT also on lasix,   Regarding his LV thrombus, started on initially on heparin héctor switch to eliquis  Wean off O2 as tolerated   4/4: Patient resting in bed, afebrile, still with SOB, on 4L of oxygen   Cont on Lasix and also on eliquis for his LV thrombus  Wean off O2 as tolerated   4/5: Patient seen and examined, started having nose bleed this AM, held eliquis initially and started phenylephrine nasal spray   His bleeding has now stopped,  Will restart eliquis on 5 mg this afternoon instead of 10   Close monitor for bleeding   4/6: Patient seen and examined, afebrile, no nausea or vomiting feels weak, his BP on the low side will decrease lasix dose to 20 mg.  Cont on eliquis no more nose bleed   Close monitoring on tele   4/7: Patient seen and examined, afebrile, no nausea or vomting, he had a CT head yesterday showing possible CVA. MRI brain was done showing acute CVA   Neurology was consulted appreciate rec.   4/8: Patient seen and examined, a rapid response was called  last night and code stroke as patient was having vision changes per neurology not a candidate for TPA since already in eliquis   Repeat Ct head no changes from the previous one   Seen by neurology cont on aspirin and eliquis appreciate rec.   I have discussed this patient's plan of care and discharge plan at IDT rounds today with Case Management, Nursing, Nursing leadership, and other members of the IDT team.    Consultants/Specialty  cardiology    Code Status  Full Code    Disposition  Medically Cleared  I have placed the appropriate orders for post-discharge needs.    Review of Systems  Review of Systems   Constitutional:  Negative for chills and fever.   Respiratory:  Positive for cough and shortness of breath.    Cardiovascular:  Negative for chest pain.   Gastrointestinal:  Negative for abdominal pain, diarrhea, nausea and vomiting.   Genitourinary:  Negative for dysuria and urgency.        Physical Exam  Temp:  [35.8 °C (96.4 °F)-37 °C (98.6 °F)] 37 °C (98.6 °F)  Pulse:  [61-88] 73  Resp:  [16-28] 17  BP: ()/() 114/60  SpO2:  [93 %-98 %] 98 %    Physical Exam  Constitutional:       Appearance: Normal appearance.   HENT:      Head: Normocephalic and atraumatic.      Mouth/Throat:      Mouth: Mucous membranes are moist.      Pharynx: Oropharynx is clear. No oropharyngeal exudate or posterior oropharyngeal erythema.   Eyes:      General: No scleral icterus.  Cardiovascular:      Rate and Rhythm: Normal rate and regular rhythm.      Pulses: Normal pulses.      Heart sounds: Normal heart sounds. No murmur heard.  Pulmonary:      Effort: Pulmonary effort is normal. No respiratory distress.      Breath sounds: Normal breath sounds. No wheezing.   Abdominal:      Palpations: Abdomen is soft.      Tenderness: There is no abdominal tenderness.   Musculoskeletal:         General: No swelling or tenderness. Normal range of motion.      Cervical back: Normal range of motion.   Skin:     General: Skin is warm  and dry.   Neurological:      General: No focal deficit present.      Mental Status: He is alert and oriented to person, place, and time. Mental status is at baseline.   Psychiatric:         Mood and Affect: Mood normal.         Fluids    Intake/Output Summary (Last 24 hours) at 4/8/2024 1242  Last data filed at 4/8/2024 1121  Gross per 24 hour   Intake 238 ml   Output 1950 ml   Net -1712 ml       Laboratory  Recent Labs     04/06/24  0119 04/07/24  0107 04/08/24  0050   WBC 8.2 7.1 6.7   RBC 4.73 4.55* 4.51*   HEMOGLOBIN 14.5 14.1 14.2   HEMATOCRIT 43.6 41.0* 40.3*   MCV 92.2 90.1 89.4   MCH 30.7 31.0 31.5   MCHC 33.3 34.4 35.2   RDW 43.5 42.2 41.7   PLATELETCT 244 223 285   MPV 11.4 11.5 11.5     Recent Labs     04/06/24  0119 04/07/24  0107 04/08/24  0050   SODIUM 138 135 134*   POTASSIUM 4.2 3.9 4.0   CHLORIDE 101 100 104   CO2 24 23 21   GLUCOSE 104* 150* 127*   BUN 25* 21 20   CREATININE 1.13 1.04 1.04   CALCIUM 8.8 8.4* 8.1*                       Imaging  CT-CTA NECK WITH & W/O-POST PROCESSING   Final Result      1.  Mild bilateral atherosclerosis with less than 50% ICA stenosis.      CT-CTA HEAD WITH & W/O-POST PROCESS   Final Result      1.  CT angiogram of the [place] of [place] demonstrating no large vessel occlusion.      CT-HEAD W/O   Final Result      1.  No significant interval change in the CT appearance of multiple bilateral areas of infarct of varying acuity/chronicity.   2.  Stable left acute cerebellar infarct with no hemorrhagic transformation.   3.  No acute hemorrhage.   4.  There is low attenuation in the periventricular white matter most consistent with chronic small vessel ischemic change, although gliosis and demyelination could have this appearance.   5.  There is mild atrophy.         MR-BRAIN-W/O   Final Result         1. Acute ischemic infarcts within the left cerebellum, right periventricular white matter, and right parietal lobe.   2. Old right frontal and parietal cortical infarcts.    3. Moderate chronic ischemic white matter demyelination.   4. Right maxillary sinus opacification, consistent with chronic maxillary sinusitis.         CT-HEAD W/O   Final Result      1.  No acute intracranial hemorrhage.   2.  Multiple infarcts that appear to be of varying chronicity involving the left cerebellum, right frontal and parietal lobes, and the left temporal lobe. MRI is recommended for further evaluation.   3.  Lacunar infarct left frontal lobe white matter, possibly chronic.   4.  White matter disease likely representing microvascular ischemic changes.   5.  Atrophy.   6.  Chronic right maxillary sinusitis.         EC-ECHOCARDIOGRAM COMPLETE W/O CONT   Final Result      DX-CHEST-PORTABLE (1 VIEW)   Final Result         1.  Scattered hazy bilateral pulmonary infiltrates, greatest in the right upper lobe.   2.  Trace bilateral pleural effusions   3.  Cardiomegaly   4.  Atherosclerosis           Assessment/Plan  * LV (left ventricular) mural thrombus  Assessment & Plan  Was on heparin drip   Will switch to eliquis with loading dose  Close monitoring on tele   Cardiology following case discussed appreciate rec.       Systolic HF (heart failure) ([facility])  Assessment & Plan  Cardiology following and case discussed , now on GDMT  Wean off O2   Appreciate rec.     Stroke ([facility])  Assessment & Plan  His CT showed CVA, MRI brain was done showing acute CVA  Neurology was consulted appreciate rec.  Patient already on eliquis an aspirin     4/8: a rapid response was called last night and code stroke as patient was having vision changes per neurology not a candidate for TPA since already in eliquis   Repeat Ct head no changes from the previous one   Seen again by neurology cont on aspirin and eliquis appreciate rec.       History of pulmonary embolus (PE)- (present on admission)  Assessment & Plan  Patient denies any history of any blood clot in his lungs and is on apixaban  He stopped taking all his meds.  CTA at  outside facility negative for PE  Outpatient follow-up    Pneumonia of both upper lobes due to infectious organism- (present on admission)  Assessment & Plan  Patient feeling shortness of breath, has had a productive cough for the past 4 days.  Denies fever/chills  Respiratory panel, procalcitonin ordered  He was started on antibiotics at outside facility, will continue for now    Dyslipidemia- (present on admission)  Assessment & Plan  He stopped taking his meds.  Restart atorvastatin    Coronary artery disease involving native coronary artery of native heart without angina pectoris- (present on admission)  Assessment & Plan  Said he had 4 stents placed 2022.  Has not been on any of his medications  Restart aspirin    Essential hypertension- (present on admission)  Assessment & Plan  Stop taking all his medications few months ago because he could not follow his physician  Restart metoprolol.  As needed antihypertensives ordered  Monitor and adjust as needed     Acute respiratory failure with hypoxia (HCC)- (present on admission)  Assessment & Plan  Requiring fluid on my evaluation, desaturating on oxygen was lowered  Possible related to systolic HF now on lasix also concern for pneumonia cont on abx switching to Augmentin  Wean off O 2 as tolerated        VTE prophylaxis: Eliquis     I have performed a physical exam and reviewed and updated ROS and Plan today (4/8/2024). In review of yesterday's note (4/7/2024), there are no changes except as documented above.    Greater than 51 minutes spent prepping to see patient (e.g. review of tests) obtaining and/or reviewing separately obtained history. Performing a medically appropriate examination and/ evaluation.  Counseling and educating the patient/family/caregiver.  Ordering medications, tests, or procedures.  Referring and communicating with other health care professionals.  Documenting clinical information in EPIC.  Independently interpreting results and  communicating results to patient/family/caregiver.  Care coordination.

## 2024-04-09 ENCOUNTER — PHARMACY VISIT (OUTPATIENT)
Dept: PHARMACY | Facility: MEDICAL CENTER | Age: 69
End: 2024-04-09
Payer: COMMERCIAL

## 2024-04-09 VITALS
HEART RATE: 85 BPM | SYSTOLIC BLOOD PRESSURE: 118 MMHG | OXYGEN SATURATION: 95 % | BODY MASS INDEX: 21.83 KG/M2 | WEIGHT: 161.16 LBS | TEMPERATURE: 97.6 F | HEIGHT: 72 IN | RESPIRATION RATE: 18 BRPM | DIASTOLIC BLOOD PRESSURE: 75 MMHG

## 2024-04-09 LAB
ANION GAP SERPL CALC-SCNC: 9 MMOL/L (ref 7–16)
BUN SERPL-MCNC: 23 MG/DL (ref 8–22)
CALCIUM SERPL-MCNC: 8.6 MG/DL (ref 8.5–10.5)
CHLORIDE SERPL-SCNC: 103 MMOL/L (ref 96–112)
CO2 SERPL-SCNC: 27 MMOL/L (ref 20–33)
CREAT SERPL-MCNC: 1.35 MG/DL (ref 0.5–1.4)
ERYTHROCYTE [DISTWIDTH] IN BLOOD BY AUTOMATED COUNT: 43.8 FL (ref 35.9–50)
GFR SERPLBLD CREATININE-BSD FMLA CKD-EPI: 57 ML/MIN/1.73 M 2
GLUCOSE SERPL-MCNC: 109 MG/DL (ref 65–99)
HCT VFR BLD AUTO: 45 % (ref 42–52)
HGB BLD-MCNC: 15.3 G/DL (ref 14–18)
MCH RBC QN AUTO: 31.2 PG (ref 27–33)
MCHC RBC AUTO-ENTMCNC: 34 G/DL (ref 32.3–36.5)
MCV RBC AUTO: 91.8 FL (ref 81.4–97.8)
PLATELET # BLD AUTO: 298 K/UL (ref 164–446)
PMV BLD AUTO: 11.1 FL (ref 9–12.9)
POTASSIUM SERPL-SCNC: 4.5 MMOL/L (ref 3.6–5.5)
RBC # BLD AUTO: 4.9 M/UL (ref 4.7–6.1)
SODIUM SERPL-SCNC: 139 MMOL/L (ref 135–145)
WBC # BLD AUTO: 6.8 K/UL (ref 4.8–10.8)

## 2024-04-09 PROCEDURE — 36415 COLL VENOUS BLD VENIPUNCTURE: CPT

## 2024-04-09 PROCEDURE — 85027 COMPLETE CBC AUTOMATED: CPT

## 2024-04-09 PROCEDURE — 700102 HCHG RX REV CODE 250 W/ 637 OVERRIDE(OP)

## 2024-04-09 PROCEDURE — A9270 NON-COVERED ITEM OR SERVICE: HCPCS

## 2024-04-09 PROCEDURE — 99239 HOSP IP/OBS DSCHRG MGMT >30: CPT | Performed by: STUDENT IN AN ORGANIZED HEALTH CARE EDUCATION/TRAINING PROGRAM

## 2024-04-09 PROCEDURE — A9270 NON-COVERED ITEM OR SERVICE: HCPCS | Performed by: INTERNAL MEDICINE

## 2024-04-09 PROCEDURE — A9270 NON-COVERED ITEM OR SERVICE: HCPCS | Performed by: STUDENT IN AN ORGANIZED HEALTH CARE EDUCATION/TRAINING PROGRAM

## 2024-04-09 PROCEDURE — RXMED WILLOW AMBULATORY MEDICATION CHARGE: Performed by: STUDENT IN AN ORGANIZED HEALTH CARE EDUCATION/TRAINING PROGRAM

## 2024-04-09 PROCEDURE — 700102 HCHG RX REV CODE 250 W/ 637 OVERRIDE(OP): Performed by: INTERNAL MEDICINE

## 2024-04-09 PROCEDURE — 700102 HCHG RX REV CODE 250 W/ 637 OVERRIDE(OP): Performed by: STUDENT IN AN ORGANIZED HEALTH CARE EDUCATION/TRAINING PROGRAM

## 2024-04-09 PROCEDURE — 80048 BASIC METABOLIC PNL TOTAL CA: CPT

## 2024-04-09 RX ORDER — SPIRONOLACTONE 25 MG/1
25 TABLET ORAL DAILY
Qty: 30 TABLET | Refills: 0 | Status: SHIPPED | OUTPATIENT
Start: 2024-04-10 | End: 2024-04-09

## 2024-04-09 RX ORDER — LOSARTAN POTASSIUM 25 MG/1
25 TABLET ORAL DAILY
Qty: 7 TABLET | Refills: 0 | Status: SHIPPED | OUTPATIENT
Start: 2024-04-09 | End: 2024-04-16

## 2024-04-09 RX ORDER — FUROSEMIDE 20 MG/1
20 TABLET ORAL DAILY
Qty: 30 TABLET | Refills: 0 | Status: SHIPPED | OUTPATIENT
Start: 2024-04-10 | End: 2024-04-09

## 2024-04-09 RX ORDER — FUROSEMIDE 20 MG/1
20 TABLET ORAL DAILY
Qty: 7 TABLET | Refills: 0 | Status: SHIPPED | OUTPATIENT
Start: 2024-04-10 | End: 2024-04-17

## 2024-04-09 RX ORDER — DAPAGLIFLOZIN 10 MG/1
10 TABLET, FILM COATED ORAL DAILY
Qty: 7 TABLET | Refills: 0 | Status: SHIPPED | OUTPATIENT
Start: 2024-04-10 | End: 2024-04-17

## 2024-04-09 RX ORDER — DAPAGLIFLOZIN 10 MG/1
10 TABLET, FILM COATED ORAL DAILY
Qty: 30 TABLET | Refills: 0 | Status: SHIPPED | OUTPATIENT
Start: 2024-04-10 | End: 2024-04-09

## 2024-04-09 RX ORDER — ASPIRIN 81 MG/1
81 TABLET ORAL DAILY
Qty: 100 TABLET | Refills: 0 | Status: SHIPPED | OUTPATIENT
Start: 2024-04-10 | End: 2024-04-09

## 2024-04-09 RX ORDER — SPIRONOLACTONE 25 MG/1
25 TABLET ORAL DAILY
Qty: 7 TABLET | Refills: 0 | Status: SHIPPED | OUTPATIENT
Start: 2024-04-10 | End: 2024-04-17

## 2024-04-09 RX ORDER — ASPIRIN 81 MG/1
81 TABLET ORAL DAILY
Qty: 7 TABLET | Refills: 0 | Status: SHIPPED | OUTPATIENT
Start: 2024-04-10 | End: 2024-04-17

## 2024-04-09 RX ORDER — ATORVASTATIN CALCIUM 40 MG/1
40 TABLET, FILM COATED ORAL EVERY EVENING
Qty: 7 TABLET | Refills: 0 | Status: SHIPPED | OUTPATIENT
Start: 2024-04-09 | End: 2024-04-16

## 2024-04-09 RX ORDER — ATORVASTATIN CALCIUM 40 MG/1
40 TABLET, FILM COATED ORAL EVERY EVENING
Qty: 30 TABLET | Refills: 0 | Status: SHIPPED | OUTPATIENT
Start: 2024-04-09 | End: 2024-04-09

## 2024-04-09 RX ORDER — CARVEDILOL 12.5 MG/1
12.5 TABLET ORAL 2 TIMES DAILY WITH MEALS
Qty: 60 TABLET | Refills: 0 | Status: SHIPPED | OUTPATIENT
Start: 2024-04-09 | End: 2024-04-09

## 2024-04-09 RX ORDER — LOSARTAN POTASSIUM 25 MG/1
25 TABLET ORAL DAILY
Qty: 30 TABLET | Refills: 0 | Status: SHIPPED | OUTPATIENT
Start: 2024-04-09 | End: 2024-04-09

## 2024-04-09 RX ORDER — CARVEDILOL 12.5 MG/1
12.5 TABLET ORAL 2 TIMES DAILY WITH MEALS
Qty: 14 TABLET | Refills: 0 | Status: SHIPPED | OUTPATIENT
Start: 2024-04-09 | End: 2024-04-16

## 2024-04-09 RX ADMIN — ATORVASTATIN CALCIUM 40 MG: 40 TABLET, FILM COATED ORAL at 17:44

## 2024-04-09 RX ADMIN — CARVEDILOL 12.5 MG: 12.5 TABLET, FILM COATED ORAL at 07:47

## 2024-04-09 RX ADMIN — APIXABAN 5 MG: 5 TABLET, FILM COATED ORAL at 05:20

## 2024-04-09 RX ADMIN — CARVEDILOL 12.5 MG: 12.5 TABLET, FILM COATED ORAL at 17:45

## 2024-04-09 RX ADMIN — FUROSEMIDE 20 MG: 20 TABLET ORAL at 05:20

## 2024-04-09 RX ADMIN — APIXABAN 5 MG: 5 TABLET, FILM COATED ORAL at 17:45

## 2024-04-09 RX ADMIN — SPIRONOLACTONE 25 MG: 25 TABLET ORAL at 05:20

## 2024-04-09 RX ADMIN — ASPIRIN 81 MG: 81 TABLET, COATED ORAL at 05:20

## 2024-04-09 RX ADMIN — DAPAGLIFLOZIN 10 MG: 10 TABLET, FILM COATED ORAL at 05:20

## 2024-04-09 RX ADMIN — LOSARTAN POTASSIUM 25 MG: 25 TABLET, FILM COATED ORAL at 17:45

## 2024-04-09 NOTE — DISCHARGE SUMMARY
Discharge Summary    CHIEF COMPLAINT ON ADMISSION  Chief Complaint   Patient presents with    Chest Pain     x4days    Shortness of Breath     X4 days, worsens with exertion          Reason for Admission  EMS     Admission Date  4/2/2024    CODE STATUS  Full Code    HPI & HOSPITAL COURSE  This is a 68 y.o. male who was transferred from [facility] 4/2/2024 with shortness of breath and chest pain.  PMH of CAD with stents placement, s/p AICD, cardiomyopathy, hypertension, dyslipidemia, hx of PE and LV apical thrombus on eliquis.  Patient said about 7 or 8 months ago he stopped taking all his medications because he could not reach his doctors in [place].  For the past 4 days he has had shortness of breath, right-sided sharp chest pain that came on at rest, productive cough.      Here patient was noted to have significantly fluid overloaded. Echo showed EF of 15%-15%, Echogenic non-enhancing mass 1.1x 2.4 cm in LV most likely thrombus. Cardiology was consulted. Patient was started with diuresis. GDMT was resumed including coreg, losartan, aldactone and Farxiga.     Regards LV apical thrombus, he was started on heparin gtt. He was started on Eliquis 10mg bid for 7 days followed by 5mg bid for total 3 months of treatment.     He is also on ASA for CAD.     During his hospitalization, patient reports headache and blurry vision for which a brain CT was obtained revealing multiple infarcts that appears to be of varying chronicity involving the left cerebellum, right frontal and parietal head region. Subsequently a brain MRI was obtained which revealed acute ischemic infarct within the left cerebellum, right periventricular white matter and right parietal head region. Neurology was consulted.  Etiology likely cardioembolic given presence of moral thrombus in left ventricle.  Continue Eliquis and aspirin.  No acute stroke intervention indicated.    On the day of discharge patient is hemodynamically stable, denies  headache, blurry vision, chest pain or shortness of breath.  Home oxygen was evaluated and oxygen has been delivered at bedside prior to discharge.     Meds have been sent to his preferred pharmacy with 30 days supply and also sent to our pharmacy with 7 days supply. Cardiology and neurology [facility] referrals were sent.  Patient is advised to follow-up with PCP, cardiology and [facility] as outpatient.  Compliance with medications was discussed with the patient.  He voiced understanding    Therefore, he is discharged in good and stable condition to home with close outpatient follow-up.    The patient met 2-midnight criteria for an inpatient stay at the time of discharge.    Discharge Date  4/9/24    FOLLOW UP ITEMS POST DISCHARGE  PCP  Cardiology  neurology    DISCHARGE DIAGNOSES  Principal Problem:    LV (left ventricular) mural thrombus (POA: Unknown)  Active Problems:    Acute respiratory failure with hypoxia (HCC) (POA: Yes)    Essential hypertension (POA: Yes)    Coronary artery disease involving native coronary artery of native heart without angina pectoris (POA: Yes)    Dyslipidemia (POA: Yes)    Pneumonia of both upper lobes due to infectious organism (POA: Yes)    History of pulmonary embolus (PE) (POA: Yes)    Systolic HF (heart failure) (HCC) (POA: Unknown)    Stroke (HCC) (POA: Unknown)  Resolved Problems:    * No resolved hospital problems. *      FOLLOW UP  No future appointments.  [id number] [place] [address] [phone]  Go on 4/16/2024  Go for your Heart Failure follow up  Appts and Walk-Ins M-F 8am-5pm  Sliding Scale      MEDICATIONS ON DISCHARGE     Medication List        START taking these medications        Instructions   Aspirin Low Dose 81 MG EC tablet  Generic drug: aspirin   Take 1 Tablet by mouth every day for 7 days.  Dose: 81 mg     atorvastatin 40 MG Tabs  Commonly known as: Lipitor   Take 1 Tablet by mouth every  evening for 7 days.  Dose: 40 mg     carvedilol 12.5 MG Tabs  Commonly known as: Coreg   Take 1 Tablet by mouth 2 times a day with meals for 7 days.  Dose: 12.5 mg     Eliquis 5 MG Tabs  Generic drug: apixaban   Take 1 Tablet by mouth 2 times a day for 7 days.  Dose: 5 mg     Farxiga 10 MG Tabs  Generic drug: dapagliflozin propanediol   Take 1 Tablet by mouth every day for 7 days.  Dose: 10 mg     furosemide 20 MG Tabs  Commonly known as: Lasix   Take 1 Tablet by mouth every day for 7 days.  Dose: 20 mg     losartan 25 MG Tabs  Commonly known as: Cozaar   Take 1 Tablet by mouth every day at 6 PM for 7 days.  Dose: 25 mg     spironolactone 25 MG Tabs  Commonly known as: Aldactone   Take 1 Tablet by mouth every day for 7 days.  Dose: 25 mg              Allergies  No Known Allergies    DIET  Orders Placed This Encounter   Procedures    Diet Order Diet: Cardiac; Second Modifier: (optional): 2 Gram Sodium; Fluid modifications: (optional): 1800 ml Fluid Restriction     Standing Status:   Standing     Number of Occurrences:   1     Order Specific Question:   Diet:     Answer:   Cardiac [6]     Order Specific Question:   Second Modifier: (optional)     Answer:   2 Gram Sodium [7]     Order Specific Question:   Fluid modifications: (optional)     Answer:   1800 ml Fluid Restriction [10]       ACTIVITY  As tolerated.  Weight bearing as tolerated    CONSULTATIONS  Neurology  cardiology    PROCEDURES  na    LABORATORY  Lab Results   Component Value Date    SODIUM 139 04/09/2024    POTASSIUM 4.5 04/09/2024    CHLORIDE 103 04/09/2024    CO2 27 04/09/2024    GLUCOSE 109 (H) 04/09/2024    BUN 23 (H) 04/09/2024    CREATININE 1.35 04/09/2024        Lab Results   Component Value Date    WBC 6.8 04/09/2024    HEMOGLOBIN 15.3 04/09/2024    HEMATOCRIT 45.0 04/09/2024    PLATELETCT 298 04/09/2024      CT-CTA NECK WITH & W/O-POST PROCESSING   Final Result      1.  Mild bilateral atherosclerosis with less than 50% ICA stenosis.      CT-CTA  HEAD WITH & W/O-POST PROCESS   Final Result      1.  CT angiogram of the [place] of [place] demonstrating no large vessel occlusion.      CT-HEAD W/O   Final Result      1.  No significant interval change in the CT appearance of multiple bilateral areas of infarct of varying acuity/chronicity.   2.  Stable left acute cerebellar infarct with no hemorrhagic transformation.   3.  No acute hemorrhage.   4.  There is low attenuation in the periventricular white matter most consistent with chronic small vessel ischemic change, although gliosis and demyelination could have this appearance.   5.  There is mild atrophy.         MR-BRAIN-W/O   Final Result         1. Acute ischemic infarcts within the left cerebellum, right periventricular white matter, and right parietal lobe.   2. Old right frontal and parietal cortical infarcts.   3. Moderate chronic ischemic white matter demyelination.   4. Right maxillary sinus opacification, consistent with chronic maxillary sinusitis.         CT-HEAD W/O   Final Result      1.  No acute intracranial hemorrhage.   2.  Multiple infarcts that appear to be of varying chronicity involving the left cerebellum, right frontal and parietal lobes, and the left temporal lobe. MRI is recommended for further evaluation.   3.  Lacunar infarct left frontal lobe white matter, possibly chronic.   4.  White matter disease likely representing microvascular ischemic changes.   5.  Atrophy.   6.  Chronic right maxillary sinusitis.         EC-ECHOCARDIOGRAM COMPLETE W/O CONT   Final Result      DX-CHEST-PORTABLE (1 VIEW)   Final Result         1.  Scattered hazy bilateral pulmonary infiltrates, greatest in the right upper lobe.   2.  Trace bilateral pleural effusions   3.  Cardiomegaly   4.  Atherosclerosis            Total time of the discharge process 32 minutes.

## 2024-04-09 NOTE — PROGRESS NOTES
Monitor summary:        Rhythm: Sinus Rhythm  Rate: 63-72  Ectopy: PVC  Measurements: .17/.10/.42       12hr chart check

## 2024-04-09 NOTE — CARE PLAN
The patient is Stable - Low risk of patient condition declining or worsening    Shift Goals  Clinical Goals: Monitor neuro, maintain safety/comfort  Patient Goals: Rest  Family Goals: TOSIN    Progress made toward(s) clinical / shift goals:      Problem: Respiratory  Goal: Patient will achieve/maintain optimum respiratory ventilation and gas exchange  Outcome: Progressing     Problem: Hemodynamics - Pneumonia  Goal: Patient's hemodynamics, fluid balance and neurologic status will be stable or improve  Outcome: Progressing     Problem: Pain - Standard  Goal: Alleviation of pain or a reduction in pain to the patient’s comfort goal  Outcome: Progressing       Patient is not progressing towards the following goals:

## 2024-04-09 NOTE — DISCHARGE PLANNING
"Called [facility] again. Spoke to Rosa Maria, \"the van is on the way.\"  Called ER Agnieszka CRUZ  and alerted her that the bedside RN will be calling to request uber when tanks delivered.   Pt aware of plan  "

## 2024-04-09 NOTE — DISCHARGE PLANNING
0744  [facility]OY sent O2 orders to [facility] @0744 per choice form obtained from media and requested by ALEXIS DUMONT Pat.     0919  TROY sent O2 orders to [facility] via [employer] at 0919 per choice form obtained from media, as [facility] is not contracted with insurance provider.     1040  Agency/Facility Name: [facility]   Spoke To: Sheila   Outcome: DPA called to f/u on order, per Sheila order has been receive and has been sent to the [facility] to have it processed.

## 2024-04-09 NOTE — DISCHARGE INSTRUCTIONS
Discharge Instructions per Jayda Lagos M.D.    Please follow-up with PCP as outpatient in one week  Please take medications as prescribed  Please note you are taking Eliquis. Eliquis a blood thinner. It increases risks of bleeding. Please avoid falling and avoid to take NSAID (such as Aleve, Motrin or Ibuprofen) while you are on this medication. Please seek medical attention if you have uncontrolled bleedings.   Please follow up with cardiology and neurology as outpatient     Return to ER in the event of new or worsening symptoms. Please note importance of compliance and the patient has agreed to proceed with all medical recommendations and follow up plan indicated above. All medications come with benefits and risks. Risks may include permanent injury or death and these risks can be minimized with close reassessment and monitoring. Please make it to your scheduled follow ups with PCP         HF Patient Discharge Instructions  Monitor your weight daily, and maintain a weight chart, to track your weight changes.   Activity as tolerated, unless your Doctor has ordered otherwise. Other activity order: n/a.  Follow a low fat, low cholesterol, low salt diet unless instructed otherwise by your Doctor. Read the labels on the back of food products and track your intake of fat, cholesterol and salt.   Fluid Restriction No. If a Fluid Restriction has been ordered by your Doctor, measure fluids with a measuring cup to ensure that you are not exceeding the restriction.   No smoking.  Oxygen No. If your Doctor has ordered that you wear Oxygen at home, it is important to wear it as ordered.  Did you receive an explanation from staff on the importance of taking each of your medications and why it is necessary to keep taking them unless your doctor says to stop? No, Retaught patient and Performed successful teach back  Were all of your questions answered about how to manage your heart failure and what to do if you have increased  signs and symptoms after you go home? Yes  Do you feel like your heart failure care team involved you in the care treatment plan and allowed you to make decisions regarding your care while in the hospital and addressed any discharge needs you might have? Yes    See the educational handout provided at discharge for more information on monitoring your daily weight, activity and diet. This also explains more about Heart Failure, symptoms of a flare-up and some of the tests that you have undergone.     Warning Signs of a Flare-Up include:  Swelling in the ankles or lower legs.  Shortness of breath, while at rest, or while doing normal activities.   Shortness of breath at night when in bed, or coughing in bed.   Requiring more pillows to sleep at night, or needing to sit up at night to sleep.  Feeling weak, dizzy or fatigued.     When to call your Doctor:  Call your Primary Care Physician or Cardiologist if:   You experience any pain radiating to your jaw or neck.  You have any difficulty breathing.  You experience weight gain of 3 lbs in a day or 5 lbs in a week.   You feel any palpitations or irregular heartbeats.  You become dizzy or lose consciousness.   If you have had an angiogram or had a pacemaker or AICD placed, and experience:  Bleeding, drainage or swelling at the surgical / puncture site.  Fever greater than 100.0 F  Shock from internal defibrillator.  Cool and / or numb extremities.     Please access the [facility] My HF Guide/Heart Failure Interactive Workbook:   [email or web address]

## 2024-04-09 NOTE — DISCHARGE PLANNING
Called [facility], spoke to Faizan. Schedule is M-W-F. Can  at [facility] at [phone] with drop off in [place]. Cost is $16.50 and they require credit card.

## 2024-04-09 NOTE — DISCHARGE PLANNING
"Called pt's wife, Mishel and asked if she is able to go to [facility] to  take home meds. She verified that she is able to get to pharmacy today before the close at 5pm.  Called [facility] again and spoke to Yara. She states that Eliquis coupon has already been used. The co-pay is $47.00. However, farxiga is $350.00. All other meds are covered at no cost to pt. Discussed with Dr. Lagos. She declined to make change in meds.  Called wife again to discuss cost and she now tells me she is unable to go to pharmacy to  meds.She states she has \"health issues.\"  I discussed with Manager,Meagan. She advised to use AS for 1 week's supply of meds from [facility] so we can discharge pt today.    "

## 2024-04-09 NOTE — DISCHARGE PLANNING
Insurance is [facility] [employer], not contracted with [facility]. Will need Eliquis. Preferred pharm is [facility] in [place] , they need escribe RX to check on cost. Messaged Dr. Lagos.

## 2024-04-09 NOTE — DISCHARGE INSTR - CASE MGT
Go to [facility] as soon a possible to  meds. (You have been provided with 1 week's supply of meds.)  [address], [place].  Phone

## 2024-04-09 NOTE — DISCHARGE PLANNING
Called [facility] again. Spoke to Sheila, she said [facility] in [place] has received auth and the local [facility] will be delivering portable tanks within 2 hrs.   Management has approved uber transport to home. Unable to arrange uber until port tanks at bedside.

## 2024-04-09 NOTE — DISCHARGE PLANNING
Cost of meds:  Apixaben 15.76  ASA 7.55  Atorvastatin 7.57  Carvedilol 7.63  Farxiga 7.57  Lasix 7.57  Losartin 7.60  Spinoloactin 7.62  Total $68.87.  AS form faxed to pharmacy.

## 2024-04-10 ENCOUNTER — TELEPHONE (OUTPATIENT)
Dept: HEALTH INFORMATION MANAGEMENT | Facility: OTHER | Age: 69
End: 2024-04-10

## 2024-04-10 NOTE — DISCHARGE PLANNING
SW set up uber ride back to pts home, ensured uber  was comfortable taking pt before RN brought pt down to vehicle.  [employer]  was happy to take pt, pt safely loaded into vehicle.

## 2024-04-10 NOTE — PROGRESS NOTES
Monitor Summary:     Rhythm: Paced/SR  Rate: 59-70  Ectopy: (R)PVC, (O)PAC  Measurements: .20/.10/.38                 12 Hour Chart Check

## 2024-04-19 ENCOUNTER — TELEPHONE (OUTPATIENT)
Dept: HOSPITALIST | Facility: MEDICAL CENTER | Age: 69
End: 2024-04-19
Payer: MEDICARE

## 2024-04-19 RX ORDER — FUROSEMIDE 20 MG/1
20 TABLET ORAL DAILY
Qty: 30 TABLET | Refills: 0 | Status: SHIPPED | OUTPATIENT
Start: 2024-04-19 | End: 2024-04-19 | Stop reason: SDUPTHER

## 2024-04-19 RX ORDER — DAPAGLIFLOZIN 10 MG/1
10 TABLET, FILM COATED ORAL DAILY
Qty: 30 TABLET | Refills: 0 | Status: SHIPPED | OUTPATIENT
Start: 2024-04-19 | End: 2024-05-19

## 2024-04-19 RX ORDER — CARVEDILOL 12.5 MG/1
12.5 TABLET ORAL 2 TIMES DAILY WITH MEALS
Qty: 60 TABLET | Refills: 0 | Status: SHIPPED | OUTPATIENT
Start: 2024-04-19 | End: 2024-04-19 | Stop reason: SDUPTHER

## 2024-04-19 RX ORDER — CARVEDILOL 12.5 MG/1
12.5 TABLET ORAL 2 TIMES DAILY WITH MEALS
Qty: 60 TABLET | Refills: 0 | Status: SHIPPED | OUTPATIENT
Start: 2024-04-19

## 2024-04-19 RX ORDER — ASPIRIN 81 MG/1
81 TABLET, CHEWABLE ORAL DAILY
Qty: 100 TABLET | Refills: 0 | Status: SHIPPED | OUTPATIENT
Start: 2024-04-19 | End: 2024-04-19 | Stop reason: SDUPTHER

## 2024-04-19 RX ORDER — ATORVASTATIN CALCIUM 40 MG/1
40 TABLET, FILM COATED ORAL NIGHTLY
Qty: 30 TABLET | Refills: 0 | Status: SHIPPED | OUTPATIENT
Start: 2024-04-19

## 2024-04-19 RX ORDER — SPIRONOLACTONE 25 MG/1
25 TABLET ORAL DAILY
Qty: 30 TABLET | Refills: 0 | Status: SHIPPED | OUTPATIENT
Start: 2024-04-19 | End: 2024-04-19 | Stop reason: SDUPTHER

## 2024-04-19 RX ORDER — LOSARTAN POTASSIUM 25 MG/1
25 TABLET ORAL DAILY
Qty: 30 TABLET | Refills: 0 | Status: SHIPPED | OUTPATIENT
Start: 2024-04-19 | End: 2024-04-19 | Stop reason: SDUPTHER

## 2024-04-19 RX ORDER — LOSARTAN POTASSIUM 25 MG/1
25 TABLET ORAL DAILY
Qty: 30 TABLET | Refills: 0 | Status: SHIPPED | OUTPATIENT
Start: 2024-04-19

## 2024-04-19 RX ORDER — FUROSEMIDE 20 MG/1
20 TABLET ORAL DAILY
Qty: 30 TABLET | Refills: 0 | Status: SHIPPED | OUTPATIENT
Start: 2024-04-19 | End: 2024-05-19

## 2024-04-19 RX ORDER — ASPIRIN 81 MG/1
81 TABLET, CHEWABLE ORAL DAILY
Qty: 100 TABLET | Refills: 0 | Status: SHIPPED | OUTPATIENT
Start: 2024-04-19

## 2024-04-19 RX ORDER — DAPAGLIFLOZIN 10 MG/1
10 TABLET, FILM COATED ORAL DAILY
Qty: 30 TABLET | Refills: 0 | Status: SHIPPED | OUTPATIENT
Start: 2024-04-19 | End: 2024-04-19 | Stop reason: SDUPTHER

## 2024-04-19 RX ORDER — ATORVASTATIN CALCIUM 40 MG/1
40 TABLET, FILM COATED ORAL NIGHTLY
Qty: 30 TABLET | Refills: 0 | Status: SHIPPED | OUTPATIENT
Start: 2024-04-19 | End: 2024-04-19 | Stop reason: SDUPTHER

## 2024-04-19 RX ORDER — SPIRONOLACTONE 25 MG/1
25 TABLET ORAL DAILY
Qty: 30 TABLET | Refills: 0 | Status: SHIPPED | OUTPATIENT
Start: 2024-04-19 | End: 2024-05-19

## 2024-04-19 NOTE — TELEPHONE ENCOUNTER
Received a call from patient's family that they have not picked up his home medications yet. Meds at pharmacy has been cancelled and family requests meds to resent to his home pharmacy.     I have resent medications to his home pharmacy [facility] at [place], [place].     Medications I sent: Eliquis, ASA, atorvastatin, Coreg, Farxiga, Lasix, Losartan and Aldactone.

## 2024-07-29 ENCOUNTER — TELEPHONE (OUTPATIENT)
Dept: OTHER | Age: 69
End: 2024-07-29

## 2024-07-30 ENCOUNTER — TELEPHONE (OUTPATIENT)
Dept: OTHER | Age: 69
End: 2024-07-30